# Patient Record
Sex: FEMALE | Race: WHITE | NOT HISPANIC OR LATINO | Employment: FULL TIME | URBAN - METROPOLITAN AREA
[De-identification: names, ages, dates, MRNs, and addresses within clinical notes are randomized per-mention and may not be internally consistent; named-entity substitution may affect disease eponyms.]

---

## 2018-11-05 ENCOUNTER — OFFICE VISIT CONVERTED (OUTPATIENT)
Dept: GASTROENTEROLOGY | Facility: CLINIC | Age: 38
End: 2018-11-05
Attending: PHYSICIAN ASSISTANT

## 2019-11-05 ENCOUNTER — OFFICE VISIT CONVERTED (OUTPATIENT)
Dept: GASTROENTEROLOGY | Facility: CLINIC | Age: 39
End: 2019-11-05
Attending: PHYSICIAN ASSISTANT

## 2019-11-07 ENCOUNTER — HOSPITAL ENCOUNTER (OUTPATIENT)
Dept: GENERAL RADIOLOGY | Facility: HOSPITAL | Age: 39
Discharge: HOME | End: 2019-11-07
Attending: PHYSICIAN ASSISTANT

## 2019-11-25 ENCOUNTER — HOSPITAL ENCOUNTER (OUTPATIENT)
Dept: NUCLEAR MEDICINE | Facility: HOSPITAL | Age: 39
Discharge: HOME | End: 2019-11-25
Attending: PHYSICIAN ASSISTANT

## 2019-12-17 ENCOUNTER — HOSPITAL ENCOUNTER (OUTPATIENT)
Dept: GASTROENTEROLOGY | Facility: HOSPITAL | Age: 39
Setting detail: HOSPITAL OUTPATIENT SURGERY
Discharge: HOME | End: 2019-12-17
Attending: INTERNAL MEDICINE

## 2019-12-17 LAB — HCG UR QL: NEGATIVE

## 2020-02-28 ENCOUNTER — OFFICE VISIT CONVERTED (OUTPATIENT)
Dept: FAMILY MEDICINE CLINIC | Facility: CLINIC | Age: 40
End: 2020-02-28
Attending: PHYSICIAN ASSISTANT

## 2020-03-13 ENCOUNTER — HOSPITAL ENCOUNTER (OUTPATIENT)
Dept: LAB | Facility: HOSPITAL | Age: 40
Discharge: HOME | End: 2020-03-13
Attending: PHYSICIAN ASSISTANT

## 2020-03-13 LAB
ALBUMIN SERPL-MCNC: 4.1 G/DL (ref 3.5–5)
ALBUMIN/GLOB SERPL: 1.4 {RATIO} (ref 1.4–2.6)
ALP SERPL-CCNC: 55 U/L (ref 42–98)
ALT SERPL-CCNC: 13 U/L (ref 10–40)
ANION GAP SERPL CALC-SCNC: 18 MMOL/L (ref 8–19)
AST SERPL-CCNC: 14 U/L (ref 15–50)
BASOPHILS # BLD AUTO: 0.05 10*3/UL (ref 0–0.2)
BASOPHILS NFR BLD AUTO: 0.7 % (ref 0–3)
BILIRUB SERPL-MCNC: 0.2 MG/DL (ref 0.2–1.3)
BUN SERPL-MCNC: 10 MG/DL (ref 5–25)
BUN/CREAT SERPL: 12 {RATIO} (ref 6–20)
CALCIUM SERPL-MCNC: 9 MG/DL (ref 8.7–10.4)
CHLORIDE SERPL-SCNC: 104 MMOL/L (ref 99–111)
CHOLEST SERPL-MCNC: 209 MG/DL (ref 107–200)
CHOLEST/HDLC SERPL: 3.6 {RATIO} (ref 3–6)
CONV ABS IMM GRAN: 0.02 10*3/UL (ref 0–0.2)
CONV CO2: 22 MMOL/L (ref 22–32)
CONV IMMATURE GRAN: 0.3 % (ref 0–1.8)
CONV TOTAL PROTEIN: 7.1 G/DL (ref 6.3–8.2)
CREAT UR-MCNC: 0.81 MG/DL (ref 0.5–0.9)
DEPRECATED RDW RBC AUTO: 40.6 FL (ref 36.4–46.3)
EOSINOPHIL # BLD AUTO: 0.12 10*3/UL (ref 0–0.7)
EOSINOPHIL # BLD AUTO: 1.6 % (ref 0–7)
ERYTHROCYTE [DISTWIDTH] IN BLOOD BY AUTOMATED COUNT: 13 % (ref 11.7–14.4)
GFR SERPLBLD BASED ON 1.73 SQ M-ARVRAT: >60 ML/MIN/{1.73_M2}
GLOBULIN UR ELPH-MCNC: 3 G/DL (ref 2–3.5)
GLUCOSE SERPL-MCNC: 94 MG/DL (ref 65–99)
HCT VFR BLD AUTO: 40.5 % (ref 37–47)
HDLC SERPL-MCNC: 58 MG/DL (ref 40–60)
HGB BLD-MCNC: 13.1 G/DL (ref 12–16)
IRON SATN MFR SERPL: 13 % (ref 20–55)
IRON SERPL-MCNC: 58 UG/DL (ref 60–170)
LDLC SERPL CALC-MCNC: 139 MG/DL (ref 70–100)
LYMPHOCYTES # BLD AUTO: 2.87 10*3/UL (ref 1–5)
LYMPHOCYTES NFR BLD AUTO: 38.1 % (ref 20–45)
MCH RBC QN AUTO: 27.9 PG (ref 27–31)
MCHC RBC AUTO-ENTMCNC: 32.3 G/DL (ref 33–37)
MCV RBC AUTO: 86.4 FL (ref 81–99)
MONOCYTES # BLD AUTO: 0.54 10*3/UL (ref 0.2–1.2)
MONOCYTES NFR BLD AUTO: 7.2 % (ref 3–10)
NEUTROPHILS # BLD AUTO: 3.94 10*3/UL (ref 2–8)
NEUTROPHILS NFR BLD AUTO: 52.1 % (ref 30–85)
NRBC CBCN: 0 % (ref 0–0.7)
OSMOLALITY SERPL CALC.SUM OF ELEC: 289 MOSM/KG (ref 273–304)
PLATELET # BLD AUTO: 277 10*3/UL (ref 130–400)
PMV BLD AUTO: 10.9 FL (ref 9.4–12.3)
POTASSIUM SERPL-SCNC: 4 MMOL/L (ref 3.5–5.3)
RBC # BLD AUTO: 4.69 10*6/UL (ref 4.2–5.4)
SODIUM SERPL-SCNC: 140 MMOL/L (ref 135–147)
T4 FREE SERPL-MCNC: 1.1 NG/DL (ref 0.9–1.8)
TIBC SERPL-MCNC: 432 UG/DL (ref 245–450)
TRANSFERRIN SERPL-MCNC: 302 MG/DL (ref 250–380)
TRIGL SERPL-MCNC: 59 MG/DL (ref 40–150)
TSH SERPL-ACNC: 2.95 M[IU]/L (ref 0.27–4.2)
VIT B12 SERPL-MCNC: 230 PG/ML (ref 211–911)
VLDLC SERPL-MCNC: 12 MG/DL (ref 5–37)
WBC # BLD AUTO: 7.54 10*3/UL (ref 4.8–10.8)

## 2020-04-20 ENCOUNTER — TELEPHONE CONVERTED (OUTPATIENT)
Dept: GASTROENTEROLOGY | Facility: CLINIC | Age: 40
End: 2020-04-20
Attending: PHYSICIAN ASSISTANT

## 2020-11-09 ENCOUNTER — TELEMEDICINE CONVERTED (OUTPATIENT)
Dept: GASTROENTEROLOGY | Facility: CLINIC | Age: 40
End: 2020-11-09
Attending: NURSE PRACTITIONER

## 2020-12-11 ENCOUNTER — OFFICE VISIT CONVERTED (OUTPATIENT)
Dept: FAMILY MEDICINE CLINIC | Facility: CLINIC | Age: 40
End: 2020-12-11
Attending: PHYSICIAN ASSISTANT

## 2020-12-21 ENCOUNTER — HOSPITAL ENCOUNTER (OUTPATIENT)
Dept: GASTROENTEROLOGY | Facility: HOSPITAL | Age: 40
Setting detail: HOSPITAL OUTPATIENT SURGERY
Discharge: HOME | End: 2020-12-21
Attending: INTERNAL MEDICINE

## 2020-12-21 LAB — HCG UR QL: NEGATIVE

## 2021-05-12 NOTE — PROGRESS NOTES
Quick Note      Patient Name: Heidi Mills   Patient ID: [id number]   Sex: Female   YOB: 1980    Primary Care Provider: Yi CHILDRESS   Referring Provider: Yi CHILDRESS    Visit Date: April 20, 2020    Provider: Mingo Hinton PA-C   Location: [facility]   Location Address: [address] [place] [address]  [id number]   Location Phone: [phone]          History Of Present Illness  TELEHEALTH TELEPHONE VISIT  Chief Complaint: Follow up EGD   Heidi Mills is a 39 year old /White female who is presenting for evaluation via telehealth telephone visit. Verbal consent obtained before beginning visit.   Provider spent 11 minutes with the patient during telehealth visit.   The following staff were present during this visit: Cj Bacon MA   Past Medical History/Overview of Patient Symptoms     39 year old female follows up for her EGD results.  She underwent the procedure for epigastric pain and bloating.  Prior to the EGD, she had a normal HIDA scan with an ejection fraction 73%.  Her EGD by Dr. Bolton 02/2020 showed a medium hiatal hernia.  She takes omeprazole 40mg prn with good control of symptoms.  She has also eliminated dietary triggers and is doing well.           Assessment  · Bloating     787.3/R14.0  · GERD (gastroesophageal reflux disease)     530.81/K21.9      Plan  · Orders  o Physician Telephone Evaluation, 11-20 minutes (09393) - 787.3/R14.0, 530.81/K21.9 - 04/20/2020  · Instructions  o Plan Of Care: Overall, the patient is doing well. She is to continue omeprazole as needed and continue avoidance of dietary triggers. She will follow up as needed.  o Chronic conditions reviewed and taken into consideration for today's treatment plan.  o Patient instructed to seek medical attention urgently for new or worsening symptoms.  o Patient was educated/instructed on their diagnosis, treatment and medications prior to discharge from the clinic  today.            Electronically Signed by: Mingo Hinton PA-C -Author on April 20, 2020 10:38:30 AM  Electronically Co-signed by: Dakota Bolton MD -Reviewer on May 7, 2020 05:28:58 PM

## 2021-05-13 NOTE — PROGRESS NOTES
Progress Note      Patient Name: Heidi Mills   Patient ID: [id number]   Sex: Female   YOB: 1980    Primary Care Provider: Yi CHILDRESS   Referring Provider: Yi CHILDRESS    Visit Date: December 11, 2020    Provider: FIOR Joe   Location: [facility]   Location Address: [address] [place] [address], [address]  [place], [place]  [id number]   Location Phone: [phone]          Chief Complaint  · follow up   · medication refill       History Of Present Illness  Heidi Mills is a 40 year old /White female who presents for evaluation and treatment of:      Patient is here today for follow up and medication refill     Anxiety: She is taking Zoloft 50mg with good results. She states she feels like she is doing well on current dose. She does need refill today. Does note Fatigue; last labs showed b12 and iron deficiency. Pt is unable to take iron secondary to GI issues; was taking b12 and felt better but ran out, just started back this week.    She is having colonoscopy 12/21/20 for ? ulcerative colitis by Dr Bolton       Past Medical History  Disease Name Date Onset Notes   Allergic rhinitis, chronic --  --    Anemia, Unspecified --  --    Anxiety 02/28/2020 --    GERD (gastroesophageal reflux disease) 02/28/2020 --    Hyperlipidemia 02/28/2020 --    IBS (irritable bowel syndrome) 02/28/2020 --    Microscopic hematuria 03/18/2016 --    Pain: Knee --  --    Screening for colon cancer 3/11/16 Per CLAUDIA Bolton, internal hemorrhoids, 1 polyp - hyperplastic         Past Surgical History  Procedure Name Date Notes   Colonoscopy 2013 2016 --    Deviated Septum Surgery --  --    EGD 2016 2019 --    Tonsilectomy --  --          Medication List  Name Date Started Instructions   dicyclomine 20 mg oral tablet  take 1 tablet by oral route As needed   Levora-28 0.15-0.03 mg oral tablet  take 1 tablet by oral route once daily   Suprep Bowel Prep Kit  "17.5-3.13-1.6 gram oral recon soln 11/10/2020 take as directed for 1 day   Zyrtec 10 mg oral tablet  take 1 tablet (10 mg) by oral route once daily         Allergy List  Allergen Name Date Reaction Notes   Vicodin --  itching --        Allergies Reconciled  Family Medical History  Disease Name Relative/Age Notes   Diabetes, unspecified type  Uncle (maternal)   Hypertension Father/  Mother/   --    Family history of colon cancer  Uncle/60s   Family history of stroke Grandmother (maternal)/   --          Social History  Finding Status Start/Stop Quantity Notes   Alcohol Never 0/0 --  drinks no   Caffeine Current every day 0/0 --  drinks regularly; coffee, tea and soft drinks; 1-2 times per day    --  --/-- --  --    Second hand smoke exposure Never 0/0 --  no   Tobacco Never --/-- --  never a smoker         Immunizations  NameDate Admin Mfg Trade Name Lot Number Route Inj VIS Given VIS Publication   [id number]/11/2020 [employer] Fluzone Quadrivalent  NE NE 12/11/2020    Comments: at work         Review of Systems  · Constitutional  o Admits  o : fatigue  o Denies  o : fever, weight loss, weight gain  · Cardiovascular  o Denies  o : lower extremity edema, claudication, chest pressure, palpitations  · Respiratory  o Denies  o : shortness of breath, wheezing, cough, hemoptysis, dyspnea on exertion  · Gastrointestinal  o Denies  o : nausea, vomiting, diarrhea, constipation, abdominal pain      Vitals  Date Time BP Position Site L\R Cuff Size HR RR TEMP (F) WT  HT  BMI kg/m2 BSA m2 O2 Sat FR L/min FiO2 HC       12/11/2020 01:21 /68 Sitting    66 - R  98 162lbs 4oz 5'  4\" 27.85 1.82 97 %  21%          Physical Examination  · Constitutional  o Appearance  o : well developed, well-nourished, no acute distress  · Head and Face  o Head  o : normocephalic, atraumatic  · Neck  o Inspection/Palpation  o : normal appearance, no masses or tenderness, trachea midline  o Thyroid  o : gland size normal, nontender, no nodules or " masses present on palpation  · Respiratory  o Respiratory Effort  o : breathing unlabored  o Inspection of Chest  o : chest rise symmetric bilaterally  o Auscultation of Lungs  o : clear to auscultation bilaterally throughout inspiration and expiration  · Cardiovascular  o Heart  o :   § Auscultation of Heart  § : regular rate and rhythm, no murmurs, gallops or rubs  o Peripheral Vascular System  o :   § Extremities  § : no edema  · Lymphatic  o Neck  o : no cervical lymphadenopathy, no supraclavicular lymphadenopathy  · Psychiatric  o Mood and Affect  o : mood normal, affect appropriate          Assessment  · Fatigue     780.79/R53.83  · Anxiety     300.00/F41.9  Continue with Zoloft 50mg qd  · Iron deficiency     280.9/E61.1  · B12 deficiency     266.2/E53.8       Recheck labs about 1 mth after being back on b12 supplement.     Problems Reconciled  Plan  · Orders  o Female Fatigue Panel (CBC, CMP, TSH, B12) HM (73237, 31512, 13586, 57608) - 780.79/R53.83 - 12/11/2020  o Iron panel (iron, TIBC, transferrin saturation) (09880, 82418, 05215) - 780.79/R53.83 - 12/11/2020  o ACO-39: Current medications updated and reviewed (, 1159F) - - 12/11/2020  · Medications  o Zoloft 50 mg oral tablet   SIG: take 1 tablet (50 mg) by oral route once daily for 90 days   DISP: (90) Tablet with 1 refills  Prescribed on 12/11/2020     o SERTRALINE HCL 50 MG TABLET 50 Tablet   SIG: TAKE 1 TABLET BY MOUTH EVERY DAY   DISP: (30) Tablet with -1 refills  Discontinued on 12/11/2020     o Medications have been Reconciled  o Transition of Care or Provider Policy  · Instructions  o Patient is taking medications as prescribed and doing well.   o Patient was educated/instructed on their diagnosis, treatment and medications prior to discharge from the clinic today.  o Call the office with any concerns or questions.  o Chronic conditions reviewed and taken into consideration for today's treatment plan.  o Electronically Identified Patient  Education Materials Provided Electronically  · Disposition  o Follow Up in 6 months.            Electronically Signed by: FIOR Joe -Author on December 11, 2020 02:08:03 PM

## 2021-05-13 NOTE — PROGRESS NOTES
Progress Note      Patient Name: Heidi Mills   Patient ID: [id number]   Sex: Female   YOB: 1980    Primary Care Provider: Yi CHILDRESS   Referring Provider: Yi CHILDRESS    Visit Date: November 9, 2020    Provider: BENNIE Phillip   Location: [facility]   Location Address: [address] [place] [address]  [id number]   Location Phone: [phone]          Chief Complaint  · ED f/u      History Of Present Illness  Video Conferencing Visit  Heidi Mills is a 40 year old /White female who is presenting for evaluation via video conferencing via [employer]. Verbal consent obtained before beginning visit.   The following staff were present during this visit: Isa Marrufo MA      40-year-old female with a history of bloating and GERD agrees to telehealth visit.  She had an ER visit on 08/25/2020 for abdominal pain.  She reports that she was having lower abdominal cramping back in August.  Nothing was alleviating or aggravating the pain.  CT abdomen/pelvis with contrast at that visit revealed colitis of the distal descending and sigmoid colon.  She was given dicyclomine, Cipro, and Flagyl, at that ER visit.  Her pain has greatly improved since her ER visit.  She reports her bowel habits have since returned back to normal since her episode of abdominal pain.  She reports she will have 1 formed bowel movement per day, with no diarrhea.  She reports that she had rectal bleeding along with the episode of abdominal pain in August.  The day she went to the ER, she had blood in the toilet bowl.  She has had 2-3 small episodes of rectal bleeding since the ER visit.  She denies family history of IBD.  She reports her uncle had colon cancer.  Her last colonoscopy in 2016 by Dr. Bolton revealed hyperplastic polyp removed from the splenic flexure.  EGD 12/2019 by Dr. Bolton revealed a medium size hiatal hernia.    Labs 08/25/2020: Hemoglobin 14,  hematocrit 41.2, platelets 288, ALT 11, AST 15, alk phos 62, total bili 0.46, creatinine 0.87, GFR greater than 60         Past Medical History  Allergic rhinitis, chronic; Anemia, Unspecified; Anxiety; GERD (gastroesophageal reflux disease); Hyperlipidemia; IBS (irritable bowel syndrome); Microscopic hematuria; Pain: Knee; Screening for colon cancer         Past Surgical History  Colonoscopy; Deviated Septum Surgery; EGD; Tonsilectomy         Medication List  dicyclomine 20 mg oral tablet; Levora-28 0.15-0.03 mg oral tablet; Zoloft 50 mg oral tablet; Zyrtec 10 mg oral tablet         Allergy List  Vicodin       Allergies Reconciled  Family Medical History  Diabetes, unspecified type; Hypertension; Family history of colon cancer; Family history of stroke         Social History  Alcohol (Never); Caffeine (Current every day); ; Second hand smoke exposure (Never); Tobacco (Never)         Immunizations  Name Date Admin   Influenza 10/01/2019         Review of Systems  · Constitutional  o Denies  o : chills, fever  · Cardiovascular  o Denies  o : chest pain  · Respiratory  o Denies  o : shortness of breath  · Gastrointestinal  o Denies  o : nausea, vomiting, dysphagia  · Endocrine  o Denies  o : weight gain, weight loss      Physical Examination  · Constitutional  o Appearance  o : Healthy-appearing, awake and alert in no acute distress  · Head and Face  o Head  o : Normocephalic with no worriesome skin lesions  · Eyes  o Vision  o :   § Visual Fields  § : eyes move symmetrical in all directions  o Sclerae  o : sclerae anicteric  · Neck  o Inspection/Palpation  o : Trachea is midline, no adenopathy  · Respiratory  o Respiratory Effort  o : Breathing is unlabored.  o Inspection of Chest  o : normal appearance          Assessment  · Rectal Bleeding     569.3/K62.5  · Lower abdominal pain     789.09/R10.30  · Abnormal CT of the abdomen     793.6/R93.5      Plan  · Orders  o Flexible Colonoscopy -Possible  risks/complications, benefits, and alternatives to surgical or invasive procedure have been explained to patient and/or legal gaurdian. -Patient has been evaluated and can tolerate anethesia and/or sedation. Risk, benefits, and alternatives to anethesia and/or sedation have been explained to patient and/or legal gaurdian. (70222) - 789.09/R10.30, 569.3/K62.5, 793.6/R93.5 - 11/09/2020  · Medications  o Medications have been Reconciled  o Transition of Care or Provider Policy  · Instructions  o 40-year-old female agrees to telehealth visit today for follow-up after having an ER visit in August. The patient's symptoms have improved since her ER visit. We have discussed undergoing a colonoscopy due to having an abnormal CT of the abdomen, rectal bleeding, and change in bowel habits. The patient has been informed that Covid testing is required prior to the procedure. The patient is agreeable to schedule a colonoscopy.  o Electronically Identified Patient Education Materials Provided Electronically            Electronically Signed by: BENNIE Phillip -Author on November 9, 2020 11:08:57 AM  Electronically Co-signed by: Dakota Bolton MD -Reviewer on November 18, 2020 12:48:50 PM

## 2021-05-14 VITALS
DIASTOLIC BLOOD PRESSURE: 68 MMHG | SYSTOLIC BLOOD PRESSURE: 118 MMHG | WEIGHT: 162.25 LBS | TEMPERATURE: 98 F | HEIGHT: 64 IN | HEART RATE: 66 BPM | BODY MASS INDEX: 27.7 KG/M2 | OXYGEN SATURATION: 97 %

## 2021-05-15 VITALS
BODY MASS INDEX: 27.23 KG/M2 | SYSTOLIC BLOOD PRESSURE: 112 MMHG | WEIGHT: 159.5 LBS | TEMPERATURE: 98 F | OXYGEN SATURATION: 99 % | DIASTOLIC BLOOD PRESSURE: 57 MMHG | HEART RATE: 76 BPM | HEIGHT: 64 IN | RESPIRATION RATE: 12 BRPM

## 2021-05-15 VITALS
HEIGHT: 64 IN | WEIGHT: 153 LBS | HEART RATE: 69 BPM | BODY MASS INDEX: 26.12 KG/M2 | DIASTOLIC BLOOD PRESSURE: 67 MMHG | SYSTOLIC BLOOD PRESSURE: 126 MMHG | RESPIRATION RATE: 16 BRPM

## 2021-05-16 VITALS
SYSTOLIC BLOOD PRESSURE: 119 MMHG | WEIGHT: 150 LBS | DIASTOLIC BLOOD PRESSURE: 71 MMHG | HEIGHT: 64 IN | BODY MASS INDEX: 25.61 KG/M2 | RESPIRATION RATE: 16 BRPM | HEART RATE: 70 BPM

## 2021-07-02 ENCOUNTER — TELEPHONE (OUTPATIENT)
Dept: FAMILY MEDICINE CLINIC | Facility: CLINIC | Age: 41
End: 2021-07-02

## 2021-07-02 DIAGNOSIS — E78.5 HYPERLIPIDEMIA, UNSPECIFIED HYPERLIPIDEMIA TYPE: ICD-10-CM

## 2021-07-02 DIAGNOSIS — R53.83 OTHER FATIGUE: Primary | ICD-10-CM

## 2021-07-02 DIAGNOSIS — F41.9 ANXIETY: ICD-10-CM

## 2021-07-02 NOTE — TELEPHONE ENCOUNTER
Caller: Heidi Mills    Relationship: Self    Best call back number: [phone]    What is the best time to reach you: ANYTIME    Who are you requesting to speak with (clinical staff, provider,  specific staff member): MEDICAL STAFF    What was the call regarding: PATIENT WOULD LIKE TO KNOW IF THERE ARE ACTIVE LAB ORDERS. IF NOT, PATIENT WOULD LIKE LAB ORDERS TO GET HER LABS DONE. ATTEMPTED TO WARM TRANSFER. PLEASE CALL PATIENT TO ADVISE.

## 2021-07-07 ENCOUNTER — LAB (OUTPATIENT)
Dept: LAB | Facility: HOSPITAL | Age: 41
End: 2021-07-07

## 2021-07-07 DIAGNOSIS — F41.9 ANXIETY: ICD-10-CM

## 2021-07-07 DIAGNOSIS — E78.5 HYPERLIPIDEMIA, UNSPECIFIED HYPERLIPIDEMIA TYPE: ICD-10-CM

## 2021-07-07 DIAGNOSIS — R53.83 OTHER FATIGUE: ICD-10-CM

## 2021-07-07 PROBLEM — D64.9 ANEMIA: Status: ACTIVE | Noted: 2021-07-07

## 2021-07-07 PROBLEM — K58.9 IBS (IRRITABLE BOWEL SYNDROME): Status: ACTIVE | Noted: 2020-02-28

## 2021-07-07 PROBLEM — K21.9 GERD (GASTROESOPHAGEAL REFLUX DISEASE): Status: ACTIVE | Noted: 2020-02-28

## 2021-07-07 LAB
25(OH)D3 SERPL-MCNC: 37.3 NG/ML (ref 30–100)
ALBUMIN SERPL-MCNC: 4.1 G/DL (ref 3.5–5.2)
ALBUMIN/GLOB SERPL: 1.5 G/DL
ALP SERPL-CCNC: 57 U/L (ref 39–117)
ALT SERPL W P-5'-P-CCNC: 17 U/L (ref 1–33)
ANION GAP SERPL CALCULATED.3IONS-SCNC: 10.7 MMOL/L (ref 5–15)
AST SERPL-CCNC: 17 U/L (ref 1–32)
BASOPHILS # BLD AUTO: 0.07 10*3/MM3 (ref 0–0.2)
BASOPHILS NFR BLD AUTO: 1 % (ref 0–1.5)
BILIRUB SERPL-MCNC: 0.2 MG/DL (ref 0–1.2)
BUN SERPL-MCNC: 10 MG/DL (ref 6–20)
BUN/CREAT SERPL: 12.7 (ref 7–25)
CALCIUM SPEC-SCNC: 9.2 MG/DL (ref 8.6–10.5)
CHLORIDE SERPL-SCNC: 106 MMOL/L (ref 98–107)
CHOLEST SERPL-MCNC: 235 MG/DL (ref 0–200)
CO2 SERPL-SCNC: 22.3 MMOL/L (ref 22–29)
CREAT SERPL-MCNC: 0.79 MG/DL (ref 0.57–1)
DEPRECATED RDW RBC AUTO: 39.6 FL (ref 37–54)
EOSINOPHIL # BLD AUTO: 0.14 10*3/MM3 (ref 0–0.4)
EOSINOPHIL NFR BLD AUTO: 1.9 % (ref 0.3–6.2)
ERYTHROCYTE [DISTWIDTH] IN BLOOD BY AUTOMATED COUNT: 12.5 % (ref 12.3–15.4)
FOLATE SERPL-MCNC: 15.6 NG/ML (ref 4.78–24.2)
GFR SERPL CREATININE-BSD FRML MDRD: 81 ML/MIN/1.73
GLOBULIN UR ELPH-MCNC: 2.8 GM/DL
GLUCOSE SERPL-MCNC: 89 MG/DL (ref 65–99)
HCT VFR BLD AUTO: 41 % (ref 34–46.6)
HDLC SERPL-MCNC: 56 MG/DL (ref 40–60)
HGB BLD-MCNC: 13.8 G/DL (ref 12–15.9)
IMM GRANULOCYTES # BLD AUTO: 0.01 10*3/MM3 (ref 0–0.05)
IMM GRANULOCYTES NFR BLD AUTO: 0.1 % (ref 0–0.5)
IRON 24H UR-MRATE: 61 MCG/DL (ref 37–145)
IRON SATN MFR SERPL: 15 % (ref 20–50)
LDLC SERPL CALC-MCNC: 160 MG/DL (ref 0–100)
LDLC/HDLC SERPL: 2.82 {RATIO}
LYMPHOCYTES # BLD AUTO: 3.48 10*3/MM3 (ref 0.7–3.1)
LYMPHOCYTES NFR BLD AUTO: 47.5 % (ref 19.6–45.3)
MCH RBC QN AUTO: 29.1 PG (ref 26.6–33)
MCHC RBC AUTO-ENTMCNC: 33.7 G/DL (ref 31.5–35.7)
MCV RBC AUTO: 86.5 FL (ref 79–97)
MONOCYTES # BLD AUTO: 0.52 10*3/MM3 (ref 0.1–0.9)
MONOCYTES NFR BLD AUTO: 7.1 % (ref 5–12)
NEUTROPHILS NFR BLD AUTO: 3.11 10*3/MM3 (ref 1.7–7)
NEUTROPHILS NFR BLD AUTO: 42.4 % (ref 42.7–76)
NRBC BLD AUTO-RTO: 0 /100 WBC (ref 0–0.2)
PLATELET # BLD AUTO: 232 10*3/MM3 (ref 140–450)
PMV BLD AUTO: 10.9 FL (ref 6–12)
POTASSIUM SERPL-SCNC: 3.9 MMOL/L (ref 3.5–5.2)
PROT SERPL-MCNC: 6.9 G/DL (ref 6–8.5)
RBC # BLD AUTO: 4.74 10*6/MM3 (ref 3.77–5.28)
SODIUM SERPL-SCNC: 139 MMOL/L (ref 136–145)
T4 FREE SERPL-MCNC: 0.87 NG/DL (ref 0.93–1.7)
TIBC SERPL-MCNC: 399 MCG/DL (ref 298–536)
TRANSFERRIN SERPL-MCNC: 268 MG/DL (ref 200–360)
TRIGL SERPL-MCNC: 106 MG/DL (ref 0–150)
TSH SERPL DL<=0.05 MIU/L-ACNC: 4.01 UIU/ML (ref 0.27–4.2)
VIT B12 BLD-MCNC: 1717 PG/ML (ref 211–946)
VLDLC SERPL-MCNC: 19 MG/DL (ref 5–40)
WBC # BLD AUTO: 7.33 10*3/MM3 (ref 3.4–10.8)

## 2021-07-07 PROCEDURE — 83540 ASSAY OF IRON: CPT

## 2021-07-07 PROCEDURE — 84443 ASSAY THYROID STIM HORMONE: CPT

## 2021-07-07 PROCEDURE — 82607 VITAMIN B-12: CPT

## 2021-07-07 PROCEDURE — 82306 VITAMIN D 25 HYDROXY: CPT

## 2021-07-07 PROCEDURE — 84439 ASSAY OF FREE THYROXINE: CPT

## 2021-07-07 PROCEDURE — 85025 COMPLETE CBC W/AUTO DIFF WBC: CPT

## 2021-07-07 PROCEDURE — 84466 ASSAY OF TRANSFERRIN: CPT

## 2021-07-07 PROCEDURE — 82746 ASSAY OF FOLIC ACID SERUM: CPT

## 2021-07-07 PROCEDURE — 80053 COMPREHEN METABOLIC PANEL: CPT

## 2021-07-07 PROCEDURE — 36415 COLL VENOUS BLD VENIPUNCTURE: CPT

## 2021-07-07 PROCEDURE — 80061 LIPID PANEL: CPT

## 2021-07-07 RX ORDER — CETIRIZINE HYDROCHLORIDE 10 MG/1
1 TABLET ORAL DAILY
COMMUNITY

## 2021-07-07 RX ORDER — NORETHINDRONE ACETATE AND ETHINYL ESTRADIOL AND FERROUS FUMARATE 1MG-20(24)
1 KIT ORAL DAILY
COMMUNITY
Start: 2021-06-28 | End: 2021-12-08

## 2021-07-07 RX ORDER — DICYCLOMINE HCL 20 MG
1 TABLET ORAL DAILY
COMMUNITY
End: 2022-03-14 | Stop reason: SDUPTHER

## 2021-07-07 NOTE — PROGRESS NOTES
Chief Complaint  Chief Complaint   Patient presents with   • Anxiety     medication refill        Subjective          Heidi Mills presents to [facility]  History of Present Illness     She is also c/o left groin area tender and swollen. NKI, she denies redness and fever. She does note a red lesion about dime size on left hip but doesn't recall tick bite. Denies fever; joint pain or other rash. Denies n/v. Denies urinary sx or vaginal discharge.    Anxiety: Patient is currently on Zoloft  for anxiety and doing well. Patient states that symptoms are well controlled. She is needing refill today     H/o anemia: stable with b12 supplement; b12 elevated therefore can decrease but not stop    HL: newly diagnosed with recent labs; will start medication today.    Reviewed labs with patient.    Medical History: has a past medical history of Anemia, Anxiety (02/28/2020), Chronic allergic rhinitis, GERD (gastroesophageal reflux disease) (02/28/2020), Hyperlipidemia (02/28/2020), IBS (irritable bowel syndrome) (02/28/2020), Microscopic hematuria (03/18/2016), Pain, knee, and Screening for colon cancer (03/11/2016).   Surgical History: has a past surgical history that includes Colonoscopy (2013,2016,2020); Nasal septum surgery; Esophagogastroduodenoscopy (2016, 2019); and Tonsillectomy.   Family History: family history includes Colon cancer in an other family member; Diabetes in her maternal uncle; Hypertension in her father and mother; Stroke in her maternal grandmother.   Social History: reports that she has never smoked. She has never used smokeless tobacco. She reports that she does not drink alcohol and does not use drugs.    Allergies: Hydrocodone-acetaminophen    Health Maintenance Due   Topic Date Due   • ANNUAL PHYSICAL  Never done   • COVID-19 Vaccine (1) Never done   • HEPATITIS C SCREENING  Never done   • PAP SMEAR  Never done       Immunization History   Administered Date(s)  "Administered   • Flu Vaccine Quad PF >18YRS 12/11/2020   • Flu Vaccine Quad PF >36MO 10/20/2020   • Influenza, Unspecified 12/11/2020   • Tdap 11/06/2013       Objective     Vitals:    07/09/21 1030   BP: 117/78   Pulse: 69   Temp: 97.6 °F (36.4 °C)   TempSrc: Temporal   SpO2: 97%   Weight: 70.9 kg (156 lb 6 oz)   Height: 162.6 cm (64\")     Body mass index is 26.84 kg/m².       Physical Exam  Vitals and nursing note reviewed.   Constitutional:       Appearance: Normal appearance.   HENT:      Head: Normocephalic and atraumatic.   Neck:      Vascular: No carotid bruit.      Comments: Thyroid : gland size normal, nontender, no nodules or masses present on palpation   Cardiovascular:      Rate and Rhythm: Normal rate and regular rhythm.      Pulses: Normal pulses.      Heart sounds: Normal heart sounds.   Pulmonary:      Effort: Pulmonary effort is normal.      Breath sounds: Normal breath sounds.   Musculoskeletal:      Cervical back: Neck supple. No tenderness.      Right lower leg: No edema.      Left lower leg: No edema.   Lymphadenopathy:      Cervical: No cervical adenopathy.   Skin:     Comments: Small red dime size lesion on left hip; about 1 cm lymph node left inguinal area tender to palpation.   Neurological:      Mental Status: She is alert.   Psychiatric:         Mood and Affect: Mood normal.         Behavior: Behavior normal.           Result Review :                      Lab on 07/07/2021   Component Date Value Ref Range Status   • Total Cholesterol 07/07/2021 235* 0 - 200 mg/dL Final   • Triglycerides 07/07/2021 106  0 - 150 mg/dL Final   • HDL Cholesterol 07/07/2021 56  40 - 60 mg/dL Final   • LDL Cholesterol  07/07/2021 160* 0 - 100 mg/dL Final   • VLDL Cholesterol 07/07/2021 19  5 - 40 mg/dL Final   • LDL/HDL Ratio 07/07/2021 2.82   Final   • Folate 07/07/2021 15.60  4.78 - 24.20 ng/mL Final   • Vitamin B-12 07/07/2021 1,717* 211 - 946 pg/mL Final   • TSH 07/07/2021 4.010  0.270 - 4.200 uIU/mL Final "   • Free T4 07/07/2021 0.87* 0.93 - 1.70 ng/dL Final    T4 results may be falsely increased if patient taking Biotin.   • Iron 07/07/2021 61  37 - 145 mcg/dL Final   • Iron Saturation 07/07/2021 15* 20 - 50 % Final   • Transferrin 07/07/2021 268  200 - 360 mg/dL Final   • TIBC 07/07/2021 399  298 - 536 mcg/dL Final   • 25 Hydroxy, Vitamin D 07/07/2021 37.3  30.0 - 100.0 ng/ml Final   • Glucose 07/07/2021 89  65 - 99 mg/dL Final   • BUN 07/07/2021 10  6 - 20 mg/dL Final   • Creatinine 07/07/2021 0.79  0.57 - 1.00 mg/dL Final   • Sodium 07/07/2021 139  136 - 145 mmol/L Final   • Potassium 07/07/2021 3.9  3.5 - 5.2 mmol/L Final   • Chloride 07/07/2021 106  98 - 107 mmol/L Final   • CO2 07/07/2021 22.3  22.0 - 29.0 mmol/L Final   • Calcium 07/07/2021 9.2  8.6 - 10.5 mg/dL Final   • Total Protein 07/07/2021 6.9  6.0 - 8.5 g/dL Final   • Albumin 07/07/2021 4.10  3.50 - 5.20 g/dL Final   • ALT (SGPT) 07/07/2021 17  1 - 33 U/L Final   • AST (SGOT) 07/07/2021 17  1 - 32 U/L Final   • Alkaline Phosphatase 07/07/2021 57  39 - 117 U/L Final   • Total Bilirubin 07/07/2021 0.2  0.0 - 1.2 mg/dL Final   • eGFR Non  Amer 07/07/2021 81  >60 mL/min/1.73 Final   • Globulin 07/07/2021 2.8  gm/dL Final   • A/G Ratio 07/07/2021 1.5  g/dL Final   • BUN/Creatinine Ratio 07/07/2021 12.7  7.0 - 25.0 Final   • Anion Gap 07/07/2021 10.7  5.0 - 15.0 mmol/L Final   • WBC 07/07/2021 7.33  3.40 - 10.80 10*3/mm3 Final   • RBC 07/07/2021 4.74  3.77 - 5.28 10*6/mm3 Final   • Hemoglobin 07/07/2021 13.8  12.0 - 15.9 g/dL Final   • Hematocrit 07/07/2021 41.0  34.0 - 46.6 % Final   • MCV 07/07/2021 86.5  79.0 - 97.0 fL Final   • MCH 07/07/2021 29.1  26.6 - 33.0 pg Final   • MCHC 07/07/2021 33.7  31.5 - 35.7 g/dL Final   • RDW 07/07/2021 12.5  12.3 - 15.4 % Final   • RDW-SD 07/07/2021 39.6  37.0 - 54.0 fl Final   • MPV 07/07/2021 10.9  6.0 - 12.0 fL Final   • Platelets 07/07/2021 232  140 - 450 10*3/mm3 Final   • Neutrophil % 07/07/2021 42.4* 42.7 -  76.0 % Final   • Lymphocyte % 07/07/2021 47.5* 19.6 - 45.3 % Final   • Monocyte % 07/07/2021 7.1  5.0 - 12.0 % Final   • Eosinophil % 07/07/2021 1.9  0.3 - 6.2 % Final   • Basophil % 07/07/2021 1.0  0.0 - 1.5 % Final   • Immature Grans % 07/07/2021 0.1  0.0 - 0.5 % Final   • Neutrophils, Absolute 07/07/2021 3.11  1.70 - 7.00 10*3/mm3 Final   • Lymphocytes, Absolute 07/07/2021 3.48* 0.70 - 3.10 10*3/mm3 Final   • Monocytes, Absolute 07/07/2021 0.52  0.10 - 0.90 10*3/mm3 Final   • Eosinophils, Absolute 07/07/2021 0.14  0.00 - 0.40 10*3/mm3 Final   • Basophils, Absolute 07/07/2021 0.07  0.00 - 0.20 10*3/mm3 Final   • Immature Grans, Absolute 07/07/2021 0.01  0.00 - 0.05 10*3/mm3 Final   • nRBC 07/07/2021 0.0  0.0 - 0.2 /100 WBC Final         Assessment and Plan      Diagnoses and all orders for this visit:    1. Hyperlipidemia, unspecified hyperlipidemia type (Primary)  Comments:  Newly diagnosed; start Crestor 5mg qd; administration and side effects discussed; recheck lipid/CMP in 8 weeks.  Orders:  -     rosuvastatin (Crestor) 5 MG tablet; Take 1 tablet by mouth Daily.  Dispense: 90 tablet; Refill: 1  -     Comprehensive Metabolic Panel; Future  -     Lipid Panel; Future    2. Anemia, unspecified type  Comments:  Stable; decrease dose of b12 but continue to take; check labs and f/u 6mths.    3. Anxiety  Comments:  Stable; continue current medications; f/u 6mths.  Orders:  -     sertraline (ZOLOFT) 50 MG tablet; Take 1 tablet by mouth Daily.  Dispense: 90 tablet; Refill: 1    4. Lymphadenopathy  Comments:  WBC was normal with labs; start antibiotic and continue to monitor; f/u if no improvement or sx worsen.  Orders:  -     doxycycline (VIBRAMYCIN) 100 MG capsule; Take 1 capsule by mouth 2 (Two) Times a Day.  Dispense: 20 capsule; Refill: 0    5. Encounter for medication refill            Follow Up     Return in about 6 months (around 1/9/2022) for Recheck.    Patient was given instructions and counseling regarding  her condition or for health maintenance advice. Please see specific information pulled into the AVS if appropriate.

## 2021-07-09 ENCOUNTER — OFFICE VISIT (OUTPATIENT)
Dept: FAMILY MEDICINE CLINIC | Facility: CLINIC | Age: 41
End: 2021-07-09

## 2021-07-09 VITALS
OXYGEN SATURATION: 97 % | HEART RATE: 69 BPM | TEMPERATURE: 97.6 F | HEIGHT: 64 IN | WEIGHT: 156.38 LBS | SYSTOLIC BLOOD PRESSURE: 117 MMHG | BODY MASS INDEX: 26.7 KG/M2 | DIASTOLIC BLOOD PRESSURE: 78 MMHG

## 2021-07-09 DIAGNOSIS — R59.1 LYMPHADENOPATHY: ICD-10-CM

## 2021-07-09 DIAGNOSIS — Z76.0 ENCOUNTER FOR MEDICATION REFILL: ICD-10-CM

## 2021-07-09 DIAGNOSIS — D64.9 ANEMIA, UNSPECIFIED TYPE: Chronic | ICD-10-CM

## 2021-07-09 DIAGNOSIS — E78.5 HYPERLIPIDEMIA, UNSPECIFIED HYPERLIPIDEMIA TYPE: Primary | Chronic | ICD-10-CM

## 2021-07-09 DIAGNOSIS — F41.9 ANXIETY: Chronic | ICD-10-CM

## 2021-07-09 PROBLEM — K21.9 GERD (GASTROESOPHAGEAL REFLUX DISEASE): Status: RESOLVED | Noted: 2020-02-28 | Resolved: 2021-07-09

## 2021-07-09 PROCEDURE — 99214 OFFICE O/P EST MOD 30 MIN: CPT | Performed by: PHYSICIAN ASSISTANT

## 2021-07-09 RX ORDER — DOXYCYCLINE HYCLATE 100 MG/1
100 CAPSULE ORAL 2 TIMES DAILY
Qty: 20 CAPSULE | Refills: 0 | Status: SHIPPED | OUTPATIENT
Start: 2021-07-09 | End: 2021-12-20

## 2021-07-09 RX ORDER — ROSUVASTATIN CALCIUM 5 MG/1
5 TABLET, COATED ORAL DAILY
Qty: 90 TABLET | Refills: 1 | Status: SHIPPED | OUTPATIENT
Start: 2021-07-09 | End: 2021-12-20 | Stop reason: SDUPTHER

## 2021-08-19 ENCOUNTER — TELEPHONE (OUTPATIENT)
Dept: FAMILY MEDICINE CLINIC | Facility: CLINIC | Age: 41
End: 2021-08-19

## 2021-09-14 ENCOUNTER — LAB (OUTPATIENT)
Dept: LAB | Facility: HOSPITAL | Age: 41
End: 2021-09-14

## 2021-09-14 LAB
ALBUMIN SERPL-MCNC: 4.3 G/DL (ref 3.5–5.2)
ALBUMIN/GLOB SERPL: 1.5 G/DL
ALP SERPL-CCNC: 53 U/L (ref 39–117)
ALT SERPL W P-5'-P-CCNC: 17 U/L (ref 1–33)
ANION GAP SERPL CALCULATED.3IONS-SCNC: 9.9 MMOL/L (ref 5–15)
AST SERPL-CCNC: 17 U/L (ref 1–32)
BILIRUB SERPL-MCNC: 0.2 MG/DL (ref 0–1.2)
BUN SERPL-MCNC: 9 MG/DL (ref 6–20)
BUN/CREAT SERPL: 9.1 (ref 7–25)
CALCIUM SPEC-SCNC: 9.4 MG/DL (ref 8.6–10.5)
CHLORIDE SERPL-SCNC: 101 MMOL/L (ref 98–107)
CHOLEST SERPL-MCNC: 192 MG/DL (ref 0–200)
CO2 SERPL-SCNC: 26.1 MMOL/L (ref 22–29)
CREAT SERPL-MCNC: 0.99 MG/DL (ref 0.57–1)
GFR SERPL CREATININE-BSD FRML MDRD: 62 ML/MIN/1.73
GLOBULIN UR ELPH-MCNC: 2.9 GM/DL
GLUCOSE SERPL-MCNC: 86 MG/DL (ref 65–99)
HDLC SERPL-MCNC: 59 MG/DL (ref 40–60)
LDLC SERPL CALC-MCNC: 114 MG/DL (ref 0–100)
LDLC/HDLC SERPL: 1.89 {RATIO}
POTASSIUM SERPL-SCNC: 4.1 MMOL/L (ref 3.5–5.2)
PROT SERPL-MCNC: 7.2 G/DL (ref 6–8.5)
SODIUM SERPL-SCNC: 137 MMOL/L (ref 136–145)
TRIGL SERPL-MCNC: 106 MG/DL (ref 0–150)
VLDLC SERPL-MCNC: 19 MG/DL (ref 5–40)

## 2021-09-14 PROCEDURE — 36415 COLL VENOUS BLD VENIPUNCTURE: CPT | Performed by: PHYSICIAN ASSISTANT

## 2021-09-14 PROCEDURE — 80053 COMPREHEN METABOLIC PANEL: CPT | Performed by: PHYSICIAN ASSISTANT

## 2021-09-14 PROCEDURE — 80061 LIPID PANEL: CPT | Performed by: PHYSICIAN ASSISTANT

## 2021-10-07 DIAGNOSIS — F41.9 ANXIETY: Chronic | ICD-10-CM

## 2021-11-08 DIAGNOSIS — R79.89 ABNORMAL THYROID BLOOD TEST: Primary | ICD-10-CM

## 2021-11-10 DIAGNOSIS — F41.9 ANXIETY: Chronic | ICD-10-CM

## 2021-11-10 NOTE — TELEPHONE ENCOUNTER
Pharmacy refill request for Sertraline 50mg tablets. Last refilled on 10/07/2021. Next appointment with PCP is 12/20/2021

## 2021-12-08 RX ORDER — NORETHINDRONE ACETATE AND ETHINYL ESTRADIOL AND FERROUS FUMARATE 1MG-20(24)
KIT ORAL
Qty: 28 TABLET | Refills: 0 | Status: SHIPPED | OUTPATIENT
Start: 2021-12-08 | End: 2022-01-05

## 2021-12-09 ENCOUNTER — LAB (OUTPATIENT)
Dept: LAB | Facility: HOSPITAL | Age: 41
End: 2021-12-09

## 2021-12-09 DIAGNOSIS — R79.89 ABNORMAL THYROID BLOOD TEST: ICD-10-CM

## 2021-12-09 DIAGNOSIS — E78.5 HYPERLIPIDEMIA, UNSPECIFIED HYPERLIPIDEMIA TYPE: ICD-10-CM

## 2021-12-09 LAB
T4 FREE SERPL-MCNC: 1.07 NG/DL (ref 0.93–1.7)
TSH SERPL DL<=0.05 MIU/L-ACNC: 2.98 UIU/ML (ref 0.27–4.2)

## 2021-12-09 PROCEDURE — 36415 COLL VENOUS BLD VENIPUNCTURE: CPT

## 2021-12-09 PROCEDURE — 80053 COMPREHEN METABOLIC PANEL: CPT

## 2021-12-09 PROCEDURE — 84439 ASSAY OF FREE THYROXINE: CPT

## 2021-12-09 PROCEDURE — 80061 LIPID PANEL: CPT

## 2021-12-09 PROCEDURE — 84443 ASSAY THYROID STIM HORMONE: CPT

## 2021-12-10 DIAGNOSIS — E78.5 HYPERLIPIDEMIA, UNSPECIFIED HYPERLIPIDEMIA TYPE: Primary | ICD-10-CM

## 2021-12-10 LAB
ALBUMIN SERPL-MCNC: 4.2 G/DL (ref 3.5–5.2)
ALBUMIN/GLOB SERPL: 1.6 G/DL
ALP SERPL-CCNC: 47 U/L (ref 39–117)
ALT SERPL W P-5'-P-CCNC: 18 U/L (ref 1–33)
ANION GAP SERPL CALCULATED.3IONS-SCNC: 12.3 MMOL/L (ref 5–15)
AST SERPL-CCNC: 21 U/L (ref 1–32)
BILIRUB SERPL-MCNC: 0.2 MG/DL (ref 0–1.2)
BUN SERPL-MCNC: 7 MG/DL (ref 6–20)
BUN/CREAT SERPL: 7.4 (ref 7–25)
CALCIUM SPEC-SCNC: 9.1 MG/DL (ref 8.6–10.5)
CHLORIDE SERPL-SCNC: 106 MMOL/L (ref 98–107)
CHOLEST SERPL-MCNC: 178 MG/DL (ref 0–200)
CO2 SERPL-SCNC: 22.7 MMOL/L (ref 22–29)
CREAT SERPL-MCNC: 0.94 MG/DL (ref 0.57–1)
GFR SERPL CREATININE-BSD FRML MDRD: 66 ML/MIN/1.73
GLOBULIN UR ELPH-MCNC: 2.7 GM/DL
GLUCOSE SERPL-MCNC: 81 MG/DL (ref 65–99)
HDLC SERPL-MCNC: 59 MG/DL (ref 40–60)
LDLC SERPL CALC-MCNC: 105 MG/DL (ref 0–100)
LDLC/HDLC SERPL: 1.77 {RATIO}
POTASSIUM SERPL-SCNC: 3.9 MMOL/L (ref 3.5–5.2)
PROT SERPL-MCNC: 6.9 G/DL (ref 6–8.5)
SODIUM SERPL-SCNC: 141 MMOL/L (ref 136–145)
TRIGL SERPL-MCNC: 73 MG/DL (ref 0–150)
VLDLC SERPL-MCNC: 14 MG/DL (ref 5–40)

## 2021-12-20 ENCOUNTER — OFFICE VISIT (OUTPATIENT)
Dept: FAMILY MEDICINE CLINIC | Facility: CLINIC | Age: 41
End: 2021-12-20

## 2021-12-20 VITALS
HEIGHT: 64 IN | HEART RATE: 75 BPM | OXYGEN SATURATION: 98 % | BODY MASS INDEX: 27.69 KG/M2 | DIASTOLIC BLOOD PRESSURE: 72 MMHG | WEIGHT: 162.2 LBS | SYSTOLIC BLOOD PRESSURE: 112 MMHG | TEMPERATURE: 97.9 F

## 2021-12-20 DIAGNOSIS — E78.5 HYPERLIPIDEMIA, UNSPECIFIED HYPERLIPIDEMIA TYPE: Chronic | ICD-10-CM

## 2021-12-20 DIAGNOSIS — F41.9 ANXIETY: Chronic | ICD-10-CM

## 2021-12-20 PROCEDURE — 99214 OFFICE O/P EST MOD 30 MIN: CPT | Performed by: PHYSICIAN ASSISTANT

## 2021-12-20 RX ORDER — MULTIPLE VITAMINS W/ MINERALS TAB 9MG-400MCG
1 TAB ORAL DAILY
COMMUNITY

## 2021-12-20 RX ORDER — ROSUVASTATIN CALCIUM 5 MG/1
5 TABLET, COATED ORAL DAILY
Qty: 90 TABLET | Refills: 1 | Status: SHIPPED | OUTPATIENT
Start: 2021-12-20 | End: 2022-07-06 | Stop reason: SDUPTHER

## 2021-12-20 NOTE — PROGRESS NOTES
Chief Complaint  Chief Complaint   Patient presents with   • Anxiety     6 month follow up   • Hyperlipidemia       Subjective          Heidi Mills presents to [facility]  History of Present Illness     6 month follow up: Anxiety, HL    Anxiety: Patient is currently on Sertraline for anxiety and doing well. Patient states that symptoms are well controlled.    HL: Patient presents for management of hyperlipidemia. Patient is currently on Rosuvastatin. Patient denies muscle cramps and muscle weakness. Patient is monitoring diet to help lower lipids.    Labs: 12/09/2021; reviewed and improved.    Covid-19: Vaccinated    Flu: Vaccinated    PAP: 12/2020 ([facility])      Medical History: has a past medical history of Anemia, Anxiety (02/28/2020), Chronic allergic rhinitis, GERD (gastroesophageal reflux disease) (02/28/2020), Hyperlipidemia (02/28/2020), IBS (irritable bowel syndrome) (02/28/2020), Microscopic hematuria (03/18/2016), Pain, knee, and Screening for colon cancer (03/11/2016).   Surgical History: has a past surgical history that includes Colonoscopy (2013,2016,2020); Nasal septum surgery; Esophagogastroduodenoscopy (2016, 2019); and Tonsillectomy.   Family History: family history includes Colon cancer in an other family member; Diabetes in her maternal uncle; Hypertension in her father and mother; Stroke in her maternal grandmother.   Social History: reports that she has never smoked. She has never used smokeless tobacco. She reports that she does not drink alcohol and does not use drugs.    Allergies: Hydrocodone-acetaminophen    Health Maintenance Due   Topic Date Due   • ANNUAL PHYSICAL  Never done   • HEPATITIS C SCREENING  Never done       Immunization History   Administered Date(s) Administered   • COVID-19 (PFIZER) 10/15/2021, 11/05/2021   • Flu Vaccine Intradermal Quad 18-64YR 10/02/2021   • Flu Vaccine Quad PF >18YRS 12/11/2020   • Flu Vaccine  "Quad PF >36MO 10/20/2020   • Influenza, Unspecified 10/20/2020, 12/11/2020   • Tdap 11/06/2013       Objective     Vitals:    12/20/21 0852   BP: 112/72   BP Location: Left arm   Patient Position: Sitting   Pulse: 75   Temp: 97.9 °F (36.6 °C)   SpO2: 98%   Weight: 73.6 kg (162 lb 3.2 oz)   Height: 162.6 cm (64\")     Body mass index is 27.84 kg/m².       Physical Exam  Vitals and nursing note reviewed.   Constitutional:       Appearance: Normal appearance.   HENT:      Head: Normocephalic and atraumatic.   Neck:      Vascular: No carotid bruit.      Comments: Thyroid : gland size normal, nontender, no nodules or masses present on palpation   Cardiovascular:      Rate and Rhythm: Normal rate and regular rhythm.      Pulses: Normal pulses.      Heart sounds: Normal heart sounds.   Pulmonary:      Effort: Pulmonary effort is normal.      Breath sounds: Normal breath sounds.   Musculoskeletal:      Cervical back: Neck supple. No tenderness.   Lymphadenopathy:      Cervical: No cervical adenopathy.   Neurological:      Mental Status: She is alert.   Psychiatric:         Mood and Affect: Mood normal.         Behavior: Behavior normal.           Result Review :                           Assessment and Plan      Diagnoses and all orders for this visit:    1. Hyperlipidemia, unspecified hyperlipidemia type  Comments:  Stable on Crestor 5mg daily; labs reviewed; follow up in 6mths.  Orders:  -     rosuvastatin (Crestor) 5 MG tablet; Take 1 tablet by mouth Daily.  Dispense: 90 tablet; Refill: 1    2. Anxiety  Comments:  Stable on Zoloft 50mg daily;  continue current medications; follow up in 6mths.  Orders:  -     sertraline (ZOLOFT) 50 MG tablet; Take 1 tablet by mouth Daily.  Dispense: 90 tablet; Refill: 1            Follow Up     Return in about 6 months (around 6/20/2022).    Patient was given instructions and counseling regarding her condition or for health maintenance advice. Please see specific information pulled into " the AVS if appropriate.

## 2022-01-03 ENCOUNTER — OFFICE VISIT (OUTPATIENT)
Dept: FAMILY MEDICINE CLINIC | Facility: CLINIC | Age: 42
End: 2022-01-03

## 2022-01-03 VITALS
WEIGHT: 162 LBS | BODY MASS INDEX: 27.66 KG/M2 | OXYGEN SATURATION: 98 % | HEIGHT: 64 IN | HEART RATE: 78 BPM | DIASTOLIC BLOOD PRESSURE: 87 MMHG | SYSTOLIC BLOOD PRESSURE: 140 MMHG

## 2022-01-03 DIAGNOSIS — J06.9 UPPER RESPIRATORY TRACT INFECTION, UNSPECIFIED TYPE: ICD-10-CM

## 2022-01-03 DIAGNOSIS — H66.90 ACUTE OTITIS MEDIA, UNSPECIFIED OTITIS MEDIA TYPE: Primary | ICD-10-CM

## 2022-01-03 PROCEDURE — 99213 OFFICE O/P EST LOW 20 MIN: CPT | Performed by: NURSE PRACTITIONER

## 2022-01-03 RX ORDER — AMOXICILLIN 875 MG/1
875 TABLET, COATED ORAL 2 TIMES DAILY
Qty: 20 TABLET | Refills: 0 | Status: SHIPPED | OUTPATIENT
Start: 2022-01-03 | End: 2022-07-06

## 2022-01-03 RX ORDER — METHYLPREDNISOLONE 4 MG/1
TABLET ORAL
Qty: 1 EACH | Refills: 0 | Status: SHIPPED | OUTPATIENT
Start: 2022-01-03 | End: 2022-07-06

## 2022-01-03 RX ORDER — FLUCONAZOLE 150 MG/1
150 TABLET ORAL ONCE
Qty: 1 TABLET | Refills: 0 | Status: SHIPPED | OUTPATIENT
Start: 2022-01-03 | End: 2022-01-03

## 2022-01-03 NOTE — PROGRESS NOTES
Chief Complaint  Ear ache, sore throat   Subjective          Heidi Mills presents to [facility] for   History of Present Illness   Earache (both, most pain in left), Sore Throat, and Nasal Congestion, onset few days. Denies any fever or chills - states did have temp 99.0 yesterday-.   No known exposure for flu, COVID, or strep.  No cough, no shortness of breath.    Medical History  Past Medical History:   Diagnosis Date   • Anemia    • Anxiety 02/28/2020   • Chronic allergic rhinitis    • GERD (gastroesophageal reflux disease) 02/28/2020   • Hyperlipidemia 02/28/2020   • IBS (irritable bowel syndrome) 02/28/2020   • Microscopic hematuria 03/18/2016   • Pain, knee    • Screening for colon cancer 03/11/2016     Surgical History  Past Surgical History:   Procedure Laterality Date   • COLONOSCOPY  2013,2016,2020   • ENDOSCOPY  2016, 2019   • NASAL SEPTUM SURGERY     • TONSILLECTOMY       Social History  Social History     Socioeconomic History   • Marital status:    Tobacco Use   • Smoking status: Never Smoker   • Smokeless tobacco: Never Used   Vaping Use   • Vaping Use: Never used   Substance and Sexual Activity   • Alcohol use: Never   • Drug use: Never   • Sexual activity: Defer       Current Outpatient Medications:   •  Aurovela 24 FE 1-20 MG-MCG(24) per tablet, TAKE 1 TABLET BY MOUTH EVERY DAY, Disp: 28 tablet, Rfl: 0  •  cetirizine (ZyrTEC Allergy) 10 MG tablet, Take 1 tablet by mouth Daily., Disp: , Rfl:   •  cyanocobalamin (VITAMIN B-12) 1000 MCG tablet, Take 1 tablet by mouth Daily., Disp: , Rfl:   •  dicyclomine (BENTYL) 20 MG tablet, Take 1 tablet by mouth Daily., Disp: , Rfl:   •  multivitamin with minerals tablet tablet, Take 1 tablet by mouth Daily., Disp: , Rfl:   •  rosuvastatin (Crestor) 5 MG tablet, Take 1 tablet by mouth Daily., Disp: 90 tablet, Rfl: 1  •  sertraline (ZOLOFT) 50 MG tablet, Take 1 tablet by mouth Daily., Disp: 90 tablet, Rfl: 1  •   "amoxicillin (AMOXIL) 875 MG tablet, Take 1 tablet by mouth 2 (Two) Times a Day., Disp: 20 tablet, Rfl: 0  •  fluconazole (Diflucan) 150 MG tablet, Take 1 tablet by mouth 1 (One) Time for 1 dose., Disp: 1 tablet, Rfl: 0  •  methylPREDNISolone (MEDROL) 4 MG dose pack, Take as directed on package instructions., Disp: 1 each, Rfl: 0    Review of Systems     Objective     /87   Pulse 78   Ht 162.6 cm (64\")   Wt 73.5 kg (162 lb)   SpO2 98%   BMI 27.81 kg/m²     Body mass index is 27.81 kg/m².     BP re-check 124/72       Physical Exam  Vitals reviewed.   Constitutional:       Appearance: Normal appearance. She is well-developed.   HENT:      Head: Normocephalic and atraumatic.      Right Ear: Tympanic membrane, ear canal and external ear normal.      Left Ear: Ear canal and external ear normal.      Ears:      Comments: Fluid noted left TM, moderately erythematous     Nose: Congestion and rhinorrhea present.      Mouth/Throat:      Pharynx: No oropharyngeal exudate or posterior oropharyngeal erythema.   Eyes:      Conjunctiva/sclera: Conjunctivae normal.      Pupils: Pupils are equal, round, and reactive to light.   Cardiovascular:      Rate and Rhythm: Normal rate and regular rhythm.      Heart sounds: No murmur heard.  No friction rub. No gallop.    Pulmonary:      Effort: Pulmonary effort is normal.      Breath sounds: Normal breath sounds. No wheezing or rhonchi.   Skin:     General: Skin is warm and dry.   Neurological:      Mental Status: She is alert and oriented to person, place, and time.      Cranial Nerves: No cranial nerve deficit.   Psychiatric:         Mood and Affect: Mood and affect normal.         Behavior: Behavior normal.         Thought Content: Thought content normal.         Judgment: Judgment normal.         Result Review :     The following data was reviewed by: BENNIE Rodriguez on 01/03/2022:                         Assessment:  Diagnoses and all orders for this visit:    1. Acute " otitis media, unspecified otitis media type (Primary)  -     amoxicillin (AMOXIL) 875 MG tablet; Take 1 tablet by mouth 2 (Two) Times a Day.  Dispense: 20 tablet; Refill: 0  -     methylPREDNISolone (MEDROL) 4 MG dose pack; Take as directed on package instructions.  Dispense: 1 each; Refill: 0  -     fluconazole (Diflucan) 150 MG tablet; Take 1 tablet by mouth 1 (One) Time for 1 dose.  Dispense: 1 tablet; Refill: 0    2. Upper respiratory tract infection, unspecified type  Comments:  Patient declines Covid testing at this time.                Follow Up     Return if symptoms worsen or fail to improve.    Patient was given instructions and counseling regarding her condition or for health maintenance advice. Please see specific information pulled into the AVS if appropriate.     Evie Clifton, APRN  01/03/2022

## 2022-01-05 RX ORDER — NORETHINDRONE ACETATE AND ETHINYL ESTRADIOL AND FERROUS FUMARATE 1MG-20(24)
KIT ORAL
Qty: 28 TABLET | Refills: 1 | Status: SHIPPED | OUTPATIENT
Start: 2022-01-05 | End: 2022-02-02 | Stop reason: SDUPTHER

## 2022-02-02 ENCOUNTER — TELEPHONE (OUTPATIENT)
Dept: OBSTETRICS AND GYNECOLOGY | Facility: CLINIC | Age: 42
End: 2022-02-02

## 2022-02-02 RX ORDER — NORETHINDRONE ACETATE AND ETHINYL ESTRADIOL AND FERROUS FUMARATE 1MG-20(24)
1 KIT ORAL DAILY
Qty: 28 TABLET | Refills: 1 | Status: SHIPPED | OUTPATIENT
Start: 2022-02-02 | End: 2022-02-28 | Stop reason: SDUPTHER

## 2022-02-28 ENCOUNTER — OFFICE VISIT (OUTPATIENT)
Dept: OBSTETRICS AND GYNECOLOGY | Facility: CLINIC | Age: 42
End: 2022-02-28

## 2022-02-28 VITALS
HEIGHT: 63 IN | BODY MASS INDEX: 28.35 KG/M2 | WEIGHT: 160 LBS | SYSTOLIC BLOOD PRESSURE: 127 MMHG | HEART RATE: 85 BPM | DIASTOLIC BLOOD PRESSURE: 81 MMHG

## 2022-02-28 DIAGNOSIS — Z30.41 ENCOUNTER FOR SURVEILLANCE OF CONTRACEPTIVE PILLS: ICD-10-CM

## 2022-02-28 DIAGNOSIS — Z01.419 WELL WOMAN EXAM WITH ROUTINE GYNECOLOGICAL EXAM: Primary | ICD-10-CM

## 2022-02-28 PROCEDURE — G0123 SCREEN CERV/VAG THIN LAYER: HCPCS | Performed by: OBSTETRICS & GYNECOLOGY

## 2022-02-28 PROCEDURE — 99395 PREV VISIT EST AGE 18-39: CPT | Performed by: OBSTETRICS & GYNECOLOGY

## 2022-02-28 PROCEDURE — 87624 HPV HI-RISK TYP POOLED RSLT: CPT | Performed by: OBSTETRICS & GYNECOLOGY

## 2022-02-28 RX ORDER — NORETHINDRONE ACETATE AND ETHINYL ESTRADIOL AND FERROUS FUMARATE 1MG-20(24)
1 KIT ORAL DAILY
Qty: 28 TABLET | Refills: 1 | Status: SHIPPED | OUTPATIENT
Start: 2022-02-28 | End: 2022-04-28 | Stop reason: SDUPTHER

## 2022-02-28 NOTE — PROGRESS NOTES
"Well Woman Visit    CC: WWE     Myriad intake: No  Previously Qualified? NO   Tobacco/Nicotine use:  No    HPI:   41 y.o. Contraception or HRT: OCP (estrogen/progesterone)  Menses:   q 0 days, lasts 0 days, changes products q 0 hrs on heaviest days.   Pain:  None    Pt has no complaints today.      History: PMHx, Meds, Allergies, PSHx, Social Hx, and POBHx all reviewed and updated.  PCP:Yi Maharaj PA     Review of Systems     /81   Pulse 85   Ht 160 cm (63\")   Wt 72.6 kg (160 lb)   LMP 2022 (Exact Date) Comment: irreg menses  BMI 28.34 kg/m²     Physical Exam  Vitals and nursing note reviewed. Exam conducted with a chaperone present.   Constitutional:       Appearance: Normal appearance.   Neck:      Thyroid: No thyroid mass or thyromegaly.   Cardiovascular:      Rate and Rhythm: Normal rate and regular rhythm.      Heart sounds: Normal heart sounds.   Pulmonary:      Effort: Pulmonary effort is normal.      Breath sounds: Normal breath sounds.   Chest:   Breasts:      Right: Normal. No mass, nipple discharge or skin change.      Left: Normal. No mass, nipple discharge or skin change.       Abdominal:      General: Abdomen is flat. Bowel sounds are normal.      Palpations: Abdomen is soft.   Genitourinary:     General: Normal vulva.      Exam position: Lithotomy position.      Labia:         Right: No lesion.         Left: No lesion.       Urethra: No prolapse or urethral lesion.      Vagina: Normal.      Cervix: Normal.      Uterus: Normal.       Adnexa: Right adnexa normal and left adnexa normal.      Rectum: No external hemorrhoid.   Musculoskeletal:      Cervical back: Full passive range of motion without pain.   Neurological:      Mental Status: She is alert.         ASSESSMENT AND PLAN:  WWE    Diagnoses and all orders for this visit:    1. Well woman exam with routine gynecological exam (Primary)  -     Mammo Screening Digital Tomosynthesis Bilateral With CAD; Future  -     " IgP, Aptima HPV    2. Encounter for surveillance of contraceptive pills  Assessment & Plan:  Experiencing amenorrhea on current OCP  Would like to continue OCPs    Orders:  -     norethindrone-ethinyl estradiol-ferrous fumarate (Aurovela 24 FE) 1-20 MG-MCG(24) per tablet; Take 1 tablet by mouth Daily.  Dispense: 28 tablet; Refill: 1        Counseling:     All BC Options R/B/A/SE/E of each reviewed  OCP/Hormone use risk HOLLIS  Track menses, RTO IF <q21d, >7d long, or heavy  Recommend daily calcium and vitamin D  Recommend weightbearing exercise  Discussed self breast exams  Preventative:   MMG  Follow up PCP/Specialist PMHx and Labs  BREAST HEALTH- Monthly self breast exam importance and how to reviewed. MMG and/or MRI (prn) reviewed per society guidelines and her individual history. Mammo/MRI screen: Pt will call to schedule..  CERVICAL CANCER Screening- Reviewed current ASCCP guidelines for screening w and wo cotest HPV, age specific.  Screen: Updated today.  VACCINATIONS Recommended: COVID and booster PRN, Flu annually.  Importance discussed, risk being unvaccinated reviewed.  Questions answered  Smoking status- NON SMOKER.  Importance of avoiding second hand smoke.  Follow up PCP/Specialist PMHx and Labs    s/p COVID vaccine        She understands the importance of having any ordered tests to be performed in a timely fashion.  The risks of not performing them include, but are not limited to, advanced cancer stages, bone loss from osteoporosis and/or subsequent increase in morbidity and/or mortality.  She is encouraged to review her results online and/or contact or office if she has questions.     Follow Up:  Return in about 1 year (around 2/28/2023).        Pinky Webb MD  02/28/2022

## 2022-03-04 LAB
CYTOLOGIST CVX/VAG CYTO: NORMAL
CYTOLOGY CVX/VAG DOC CYTO: NORMAL
CYTOLOGY CVX/VAG DOC THIN PREP: NORMAL
DX ICD CODE: NORMAL
HIV 1 & 2 AB SER-IMP: NORMAL
HPV I/H RISK 4 DNA CVX QL PROBE+SIG AMP: NEGATIVE
OTHER STN SPEC: NORMAL
STAT OF ADQ CVX/VAG CYTO-IMP: NORMAL

## 2022-03-11 RX ORDER — DICYCLOMINE HCL 20 MG
TABLET ORAL
Qty: 90 TABLET | Refills: 5 | OUTPATIENT
Start: 2022-03-11

## 2022-03-14 DIAGNOSIS — K58.9 IRRITABLE BOWEL SYNDROME, UNSPECIFIED TYPE: Primary | ICD-10-CM

## 2022-03-14 RX ORDER — DICYCLOMINE HCL 20 MG
20 TABLET ORAL EVERY 6 HOURS PRN
Qty: 60 TABLET | Refills: 1 | Status: SHIPPED | OUTPATIENT
Start: 2022-03-14

## 2022-04-06 DIAGNOSIS — F41.9 ANXIETY: Chronic | ICD-10-CM

## 2022-04-06 DIAGNOSIS — E78.5 HYPERLIPIDEMIA, UNSPECIFIED HYPERLIPIDEMIA TYPE: Chronic | ICD-10-CM

## 2022-04-06 RX ORDER — ROSUVASTATIN CALCIUM 5 MG/1
5 TABLET, COATED ORAL DAILY
Qty: 90 TABLET | Refills: 1 | OUTPATIENT
Start: 2022-04-06

## 2022-04-06 NOTE — TELEPHONE ENCOUNTER
LVM advising pt should have refills at pharmacy to get her to her upcoming appt. And if she has any further questions to call the office.

## 2022-04-06 NOTE — TELEPHONE ENCOUNTER
Incoming Refill Request      Medication requested (name and dose):  rosuvastatin (Crestor) 5 MG tablet  sertraline (ZOLOFT) 50 MG tablet    Pharmacy where request should be sent:   [facility] - [place], [address] - [phone]  - [phone] FX        Additional details provided by patient:       Best call back number:    Does the patient have less than a 3 day supply:  [] Yes  [x] No    Pavan STAFFORD Rep  04/06/22, 13:52 EDT

## 2022-04-28 ENCOUNTER — TELEPHONE (OUTPATIENT)
Dept: OBSTETRICS AND GYNECOLOGY | Facility: CLINIC | Age: 42
End: 2022-04-28

## 2022-04-28 DIAGNOSIS — Z30.41 ENCOUNTER FOR SURVEILLANCE OF CONTRACEPTIVE PILLS: ICD-10-CM

## 2022-04-28 RX ORDER — NORETHINDRONE ACETATE AND ETHINYL ESTRADIOL AND FERROUS FUMARATE 1MG-20(24)
1 KIT ORAL DAILY
Qty: 28 TABLET | Refills: 11 | Status: SHIPPED | OUTPATIENT
Start: 2022-04-28 | End: 2022-07-19 | Stop reason: SDUPTHER

## 2022-07-06 ENCOUNTER — OFFICE VISIT (OUTPATIENT)
Dept: FAMILY MEDICINE CLINIC | Facility: CLINIC | Age: 42
End: 2022-07-06

## 2022-07-06 VITALS
OXYGEN SATURATION: 97 % | BODY MASS INDEX: 28.53 KG/M2 | HEART RATE: 71 BPM | DIASTOLIC BLOOD PRESSURE: 70 MMHG | WEIGHT: 161 LBS | SYSTOLIC BLOOD PRESSURE: 124 MMHG | HEIGHT: 63 IN

## 2022-07-06 DIAGNOSIS — Z11.59 NEED FOR HEPATITIS C SCREENING TEST: Primary | ICD-10-CM

## 2022-07-06 DIAGNOSIS — E61.1 IRON DEFICIENCY: ICD-10-CM

## 2022-07-06 DIAGNOSIS — E78.5 HYPERLIPIDEMIA, UNSPECIFIED HYPERLIPIDEMIA TYPE: Chronic | ICD-10-CM

## 2022-07-06 DIAGNOSIS — F41.9 ANXIETY: Chronic | ICD-10-CM

## 2022-07-06 PROCEDURE — 99214 OFFICE O/P EST MOD 30 MIN: CPT | Performed by: PHYSICIAN ASSISTANT

## 2022-07-06 RX ORDER — ROSUVASTATIN CALCIUM 5 MG/1
5 TABLET, COATED ORAL DAILY
Qty: 90 TABLET | Refills: 1 | Status: SHIPPED | OUTPATIENT
Start: 2022-07-06 | End: 2022-10-12 | Stop reason: SDUPTHER

## 2022-07-06 RX ORDER — FLUTICASONE PROPIONATE 50 MCG
SPRAY, SUSPENSION (ML) NASAL
COMMUNITY
Start: 2022-03-29 | End: 2022-12-19

## 2022-07-06 NOTE — PROGRESS NOTES
Chief Complaint  Chief Complaint   Patient presents with   • Anxiety   • Hyperlipidemia       Subjective          Heidi Mills presents to [facility]  History of Present Illness    6 month follow up: Anxiety, HL     Anxiety: Patient is currently on Sertraline for anxiety and doing well. Patient is requesting a refill today. Patient states that symptoms are well controlled.     HL: Patient presents for management of hyperlipidemia. Patient is currently on Rosuvastatin. Patient is requesting a refill today. Patient denies muscle cramps and muscle weakness. Patient is monitoring diet to help lower lipids.    Medical History: has a past medical history of Anemia, Anxiety (02/28/2020), Chronic allergic rhinitis, GERD (gastroesophageal reflux disease) (02/28/2020), Hyperlipidemia (02/28/2020), IBS (irritable bowel syndrome) (02/28/2020), Microscopic hematuria (03/18/2016), Pain, knee, and Screening for colon cancer (03/11/2016).   Surgical History: has a past surgical history that includes Colonoscopy (2013,2016,2020); Nasal septum surgery; Esophagogastroduodenoscopy (2016, 2019); and Tonsillectomy.   Family History: family history includes Colon cancer in an other family member; Diabetes in her maternal uncle; Hypertension in her father and mother; Stroke in her maternal grandmother.   Social History: reports that she has never smoked. She has never used smokeless tobacco. She reports that she does not drink alcohol and does not use drugs.    Allergies: Hydrocodone-acetaminophen    Health Maintenance Due   Topic Date Due   • ANNUAL PHYSICAL  Never done   • HEPATITIS C SCREENING  Never done       Immunization History   Administered Date(s) Administered   • COVID-19 (PFIZER) PURPLE CAP 10/15/2021, 11/05/2021   • Flu Vaccine Intradermal Quad 18-64YR 10/02/2021   • Flu Vaccine Quad PF >36MO 10/20/2020, 10/25/2021   • Fluzone Split Quad (Multi-dose) 12/11/2020   • Influenza, Unspecified  "10/20/2020, 12/11/2020   • Tdap 11/06/2013       Objective     Vitals:    07/06/22 1054   BP: 124/70   Pulse: 71   SpO2: 97%   Weight: 73 kg (161 lb)   Height: 160 cm (63\")     Body mass index is 28.52 kg/m².     Wt Readings from Last 3 Encounters:   07/06/22 73 kg (161 lb)   02/28/22 72.6 kg (160 lb)   01/03/22 73.5 kg (162 lb)     BP Readings from Last 3 Encounters:   07/06/22 124/70   02/28/22 127/81   01/03/22 140/87         Patient Care Team:  Yi Maharaj PA as PCP - General (Family Medicine)    Physical Exam  Vitals and nursing note reviewed.   Constitutional:       Appearance: Normal appearance.   HENT:      Head: Normocephalic and atraumatic.   Neck:      Vascular: No carotid bruit.      Comments: Thyroid : gland size normal, nontender, no nodules or masses present on palpation   Cardiovascular:      Rate and Rhythm: Normal rate and regular rhythm.      Pulses: Normal pulses.      Heart sounds: Normal heart sounds.   Pulmonary:      Effort: Pulmonary effort is normal.      Breath sounds: Normal breath sounds.   Musculoskeletal:      Cervical back: Neck supple. No tenderness.   Lymphadenopathy:      Cervical: No cervical adenopathy.   Neurological:      Mental Status: She is alert.   Psychiatric:         Mood and Affect: Mood normal.         Behavior: Behavior normal.           Result Review :                           Assessment and Plan      Diagnoses and all orders for this visit:    1. Need for hepatitis C screening test (Primary)  -     Hepatitis C Antibody; Future  -     Hepatitis C Antibody    2. Hyperlipidemia, unspecified hyperlipidemia type  Comments:  Stable on Crestor 5mg daily; check labs; follow up in 6mths.  Orders:  -     Comprehensive Metabolic Panel; Future  -     Lipid Panel; Future  -     rosuvastatin (Crestor) 5 MG tablet; Take 1 tablet by mouth Daily.  Dispense: 90 tablet; Refill: 1  -     Lipid Panel  -     Comprehensive Metabolic Panel    3. Anxiety  Comments:  Stable on " Zoloft 50mg daily;  continue current medications; follow up in 6mths.  Orders:  -     TSH; Future  -     T4, Free; Future  -     sertraline (ZOLOFT) 50 MG tablet; Take 1 tablet by mouth Daily.  Dispense: 90 tablet; Refill: 1  -     T4, Free  -     TSH    4. Iron deficiency  -     CBC & Differential; Future  -     Iron Profile; Future  -     Iron Profile  -     CBC & Differential            Follow Up     Return in about 6 months (around 1/6/2023).    Patient was given instructions and counseling regarding her condition or for health maintenance advice. Please see specific information pulled into the AVS if appropriate.

## 2022-07-19 ENCOUNTER — TELEPHONE (OUTPATIENT)
Dept: OBSTETRICS AND GYNECOLOGY | Facility: CLINIC | Age: 42
End: 2022-07-19

## 2022-07-19 DIAGNOSIS — Z30.41 ENCOUNTER FOR SURVEILLANCE OF CONTRACEPTIVE PILLS: ICD-10-CM

## 2022-07-19 RX ORDER — NORETHINDRONE ACETATE AND ETHINYL ESTRADIOL AND FERROUS FUMARATE 1MG-20(24)
1 KIT ORAL DAILY
Qty: 28 TABLET | Refills: 11 | Status: SHIPPED | OUTPATIENT
Start: 2022-07-19 | End: 2022-11-08 | Stop reason: SDUPTHER

## 2022-07-25 ENCOUNTER — PATIENT MESSAGE (OUTPATIENT)
Dept: FAMILY MEDICINE CLINIC | Facility: CLINIC | Age: 42
End: 2022-07-25

## 2022-07-25 DIAGNOSIS — J30.9 ALLERGIC RHINITIS, UNSPECIFIED SEASONALITY, UNSPECIFIED TRIGGER: Primary | ICD-10-CM

## 2022-07-26 RX ORDER — MONTELUKAST SODIUM 10 MG/1
10 TABLET ORAL NIGHTLY
Qty: 90 TABLET | Refills: 0 | Status: SHIPPED | OUTPATIENT
Start: 2022-07-26 | End: 2022-12-19 | Stop reason: SDUPTHER

## 2022-07-26 NOTE — TELEPHONE ENCOUNTER
From: Heidi Mills  To: FIOR Wyatt  Sent: 7/25/2022 12:31 PM EDT  Subject: allergies    I have had lots of drainage and my ears are popping. Sneezing and coughing as well. Could we try that claritin prescription? I believe that is what you said.

## 2022-08-22 ENCOUNTER — HOSPITAL ENCOUNTER (OUTPATIENT)
Dept: MAMMOGRAPHY | Facility: HOSPITAL | Age: 42
Discharge: HOME | End: 2022-08-22
Admitting: OBSTETRICS & GYNECOLOGY

## 2022-08-22 ENCOUNTER — LAB (OUTPATIENT)
Dept: LAB | Facility: HOSPITAL | Age: 42
End: 2022-08-22

## 2022-08-22 DIAGNOSIS — R92.8 ABNORMAL MAMMOGRAM: Primary | ICD-10-CM

## 2022-08-22 DIAGNOSIS — Z01.419 WELL WOMAN EXAM WITH ROUTINE GYNECOLOGICAL EXAM: ICD-10-CM

## 2022-08-22 LAB
ALBUMIN SERPL-MCNC: 4.2 G/DL (ref 3.5–5.2)
ALBUMIN/GLOB SERPL: 1.7 G/DL
ALP SERPL-CCNC: 56 U/L (ref 39–117)
ALT SERPL W P-5'-P-CCNC: 15 U/L (ref 1–33)
ANION GAP SERPL CALCULATED.3IONS-SCNC: 12.1 MMOL/L (ref 5–15)
AST SERPL-CCNC: 21 U/L (ref 1–32)
BASOPHILS # BLD AUTO: 0.05 10*3/MM3 (ref 0–0.2)
BASOPHILS NFR BLD AUTO: 0.6 % (ref 0–1.5)
BILIRUB SERPL-MCNC: 0.2 MG/DL (ref 0–1.2)
BUN SERPL-MCNC: 10 MG/DL (ref 6–20)
BUN/CREAT SERPL: 10.9 (ref 7–25)
CALCIUM SPEC-SCNC: 9 MG/DL (ref 8.6–10.5)
CHLORIDE SERPL-SCNC: 103 MMOL/L (ref 98–107)
CHOLEST SERPL-MCNC: 189 MG/DL (ref 0–200)
CO2 SERPL-SCNC: 24.9 MMOL/L (ref 22–29)
CREAT SERPL-MCNC: 0.92 MG/DL (ref 0.57–1)
DEPRECATED RDW RBC AUTO: 37.3 FL (ref 37–54)
EGFRCR SERPLBLD CKD-EPI 2021: 80.9 ML/MIN/1.73
EOSINOPHIL # BLD AUTO: 0.15 10*3/MM3 (ref 0–0.4)
EOSINOPHIL NFR BLD AUTO: 1.9 % (ref 0.3–6.2)
ERYTHROCYTE [DISTWIDTH] IN BLOOD BY AUTOMATED COUNT: 12.4 % (ref 12.3–15.4)
GLOBULIN UR ELPH-MCNC: 2.5 GM/DL
GLUCOSE SERPL-MCNC: 75 MG/DL (ref 65–99)
HCT VFR BLD AUTO: 39.6 % (ref 34–46.6)
HCV AB SER DONR QL: NORMAL
HDLC SERPL-MCNC: 59 MG/DL (ref 40–60)
HGB BLD-MCNC: 13.5 G/DL (ref 12–15.9)
IMM GRANULOCYTES # BLD AUTO: 0.01 10*3/MM3 (ref 0–0.05)
IMM GRANULOCYTES NFR BLD AUTO: 0.1 % (ref 0–0.5)
IRON 24H UR-MRATE: 86 MCG/DL (ref 37–145)
IRON SATN MFR SERPL: 18 % (ref 20–50)
LDLC SERPL CALC-MCNC: 110 MG/DL (ref 0–100)
LDLC/HDLC SERPL: 1.83 {RATIO}
LYMPHOCYTES # BLD AUTO: 3.33 10*3/MM3 (ref 0.7–3.1)
LYMPHOCYTES NFR BLD AUTO: 41.1 % (ref 19.6–45.3)
MCH RBC QN AUTO: 28.5 PG (ref 26.6–33)
MCHC RBC AUTO-ENTMCNC: 34.1 G/DL (ref 31.5–35.7)
MCV RBC AUTO: 83.5 FL (ref 79–97)
MONOCYTES # BLD AUTO: 0.66 10*3/MM3 (ref 0.1–0.9)
MONOCYTES NFR BLD AUTO: 8.1 % (ref 5–12)
NEUTROPHILS NFR BLD AUTO: 3.9 10*3/MM3 (ref 1.7–7)
NEUTROPHILS NFR BLD AUTO: 48.2 % (ref 42.7–76)
NRBC BLD AUTO-RTO: 0 /100 WBC (ref 0–0.2)
PLATELET # BLD AUTO: 278 10*3/MM3 (ref 140–450)
PMV BLD AUTO: 11 FL (ref 6–12)
POTASSIUM SERPL-SCNC: 3.9 MMOL/L (ref 3.5–5.2)
PROT SERPL-MCNC: 6.7 G/DL (ref 6–8.5)
RBC # BLD AUTO: 4.74 10*6/MM3 (ref 3.77–5.28)
SODIUM SERPL-SCNC: 140 MMOL/L (ref 136–145)
T4 FREE SERPL-MCNC: 1.05 NG/DL (ref 0.93–1.7)
TIBC SERPL-MCNC: 481 MCG/DL (ref 298–536)
TRANSFERRIN SERPL-MCNC: 323 MG/DL (ref 200–360)
TRIGL SERPL-MCNC: 110 MG/DL (ref 0–150)
TSH SERPL DL<=0.05 MIU/L-ACNC: 3.55 UIU/ML (ref 0.27–4.2)
VLDLC SERPL-MCNC: 20 MG/DL (ref 5–40)
WBC NRBC COR # BLD: 8.1 10*3/MM3 (ref 3.4–10.8)

## 2022-08-22 PROCEDURE — 83540 ASSAY OF IRON: CPT | Performed by: PHYSICIAN ASSISTANT

## 2022-08-22 PROCEDURE — 80061 LIPID PANEL: CPT | Performed by: PHYSICIAN ASSISTANT

## 2022-08-22 PROCEDURE — 77063 BREAST TOMOSYNTHESIS BI: CPT

## 2022-08-22 PROCEDURE — 80050 GENERAL HEALTH PANEL: CPT | Performed by: PHYSICIAN ASSISTANT

## 2022-08-22 PROCEDURE — 77067 SCR MAMMO BI INCL CAD: CPT

## 2022-08-22 PROCEDURE — 36415 COLL VENOUS BLD VENIPUNCTURE: CPT | Performed by: PHYSICIAN ASSISTANT

## 2022-08-22 PROCEDURE — 84439 ASSAY OF FREE THYROXINE: CPT | Performed by: PHYSICIAN ASSISTANT

## 2022-08-22 PROCEDURE — 84466 ASSAY OF TRANSFERRIN: CPT | Performed by: PHYSICIAN ASSISTANT

## 2022-08-22 PROCEDURE — 86803 HEPATITIS C AB TEST: CPT | Performed by: PHYSICIAN ASSISTANT

## 2022-08-23 ENCOUNTER — TELEPHONE (OUTPATIENT)
Dept: OBSTETRICS AND GYNECOLOGY | Facility: CLINIC | Age: 42
End: 2022-08-23

## 2022-08-23 NOTE — TELEPHONE ENCOUNTER
----- Message from Pinky Webb MD sent at 8/22/2022  8:47 AM EDT -----  Orders in epic for additional imaging

## 2022-08-23 NOTE — TELEPHONE ENCOUNTER
Patient called back and advised of results and recommendations. She understands the scheduling department will be calling her to set up the additional imaging.

## 2022-09-13 ENCOUNTER — HOSPITAL ENCOUNTER (OUTPATIENT)
Dept: MAMMOGRAPHY | Facility: HOSPITAL | Age: 42
Discharge: HOME | End: 2022-09-13

## 2022-09-13 ENCOUNTER — HOSPITAL ENCOUNTER (OUTPATIENT)
Dept: ULTRASOUND IMAGING | Facility: HOSPITAL | Age: 42
Discharge: HOME | End: 2022-09-13

## 2022-09-13 DIAGNOSIS — R92.8 ABNORMAL MAMMOGRAM: ICD-10-CM

## 2022-09-13 PROCEDURE — G0279 TOMOSYNTHESIS, MAMMO: HCPCS

## 2022-09-13 PROCEDURE — 77065 DX MAMMO INCL CAD UNI: CPT

## 2022-09-13 PROCEDURE — 76642 ULTRASOUND BREAST LIMITED: CPT

## 2022-10-12 DIAGNOSIS — E78.5 HYPERLIPIDEMIA, UNSPECIFIED HYPERLIPIDEMIA TYPE: Chronic | ICD-10-CM

## 2022-10-13 RX ORDER — ROSUVASTATIN CALCIUM 5 MG/1
5 TABLET, COATED ORAL DAILY
Qty: 90 TABLET | Refills: 1 | Status: SHIPPED | OUTPATIENT
Start: 2022-10-13

## 2022-10-18 ENCOUNTER — TELEPHONE (OUTPATIENT)
Dept: OBSTETRICS AND GYNECOLOGY | Facility: CLINIC | Age: 42
End: 2022-10-18

## 2022-10-18 NOTE — TELEPHONE ENCOUNTER
No refills on Aurovela 24 Fe needed at this time per Bailey at [facility].  I had received a refill request, but we had renewed her bc i07/19/22.  I called, and the pt still has 4 refills left, to last until February 2023.

## 2022-11-08 ENCOUNTER — TELEPHONE (OUTPATIENT)
Dept: OBSTETRICS AND GYNECOLOGY | Facility: CLINIC | Age: 42
End: 2022-11-08

## 2022-11-08 DIAGNOSIS — Z30.41 ENCOUNTER FOR SURVEILLANCE OF CONTRACEPTIVE PILLS: ICD-10-CM

## 2022-11-08 RX ORDER — NORETHINDRONE ACETATE AND ETHINYL ESTRADIOL AND FERROUS FUMARATE 1MG-20(24)
1 KIT ORAL DAILY
Qty: 28 TABLET | Refills: 3 | Status: SHIPPED | OUTPATIENT
Start: 2022-11-08 | End: 2023-01-03 | Stop reason: SDUPTHER

## 2022-12-08 DIAGNOSIS — F41.9 ANXIETY: Chronic | ICD-10-CM

## 2022-12-19 ENCOUNTER — OFFICE VISIT (OUTPATIENT)
Dept: FAMILY MEDICINE CLINIC | Facility: CLINIC | Age: 42
End: 2022-12-19

## 2022-12-19 VITALS
WEIGHT: 162 LBS | SYSTOLIC BLOOD PRESSURE: 124 MMHG | RESPIRATION RATE: 18 BRPM | HEART RATE: 65 BPM | OXYGEN SATURATION: 99 % | BODY MASS INDEX: 28.7 KG/M2 | HEIGHT: 63 IN | DIASTOLIC BLOOD PRESSURE: 76 MMHG

## 2022-12-19 DIAGNOSIS — F41.9 ANXIETY: Chronic | ICD-10-CM

## 2022-12-19 DIAGNOSIS — J30.9 ALLERGIC RHINITIS, UNSPECIFIED SEASONALITY, UNSPECIFIED TRIGGER: Chronic | ICD-10-CM

## 2022-12-19 DIAGNOSIS — E78.5 HYPERLIPIDEMIA, UNSPECIFIED HYPERLIPIDEMIA TYPE: Primary | Chronic | ICD-10-CM

## 2022-12-19 PROCEDURE — 99214 OFFICE O/P EST MOD 30 MIN: CPT | Performed by: PHYSICIAN ASSISTANT

## 2022-12-19 RX ORDER — MONTELUKAST SODIUM 10 MG/1
10 TABLET ORAL NIGHTLY
Qty: 90 TABLET | Refills: 1 | Status: SHIPPED | OUTPATIENT
Start: 2022-12-19

## 2022-12-19 NOTE — PROGRESS NOTES
Chief Complaint  Chief Complaint   Patient presents with   • Anxiety   • Follow-up   • Hyperlipidemia   • Irritable Bowel Syndrome       Subjective          Heidi Mills presents to [facility] for 6 month follow up for HL, IBS, and anxiety.    History of Present Illness    HL: Patient presents for management of hyperlipidemia. Patient is currently on Crestor. Patient denies muscle cramps and muscle weakness. Patient is monitoring diet to help lower lipids.    IBS: Patient presents for management of IBS. Pt is currently on Dicyclomine. Pt states her symptoms are well controlled with this medication; uses as needed.    Anxiety: Patient is currently on Zoloft for anxiety and doing well. Patient states that symptoms are well controlled.    AR: on Singulair for allergies symptoms; uses as needed with good relief of symlptoms    Mammo: 8-22-22    Medical History: has a past medical history of Anemia, Anxiety (02/28/2020), Chronic allergic rhinitis, GERD (gastroesophageal reflux disease) (02/28/2020), Hyperlipidemia (02/28/2020), IBS (irritable bowel syndrome) (02/28/2020), Microscopic hematuria (03/18/2016), Pain, knee, and Screening for colon cancer (03/11/2016).   Surgical History: has a past surgical history that includes Colonoscopy (2013,2016,2020); Nasal septum surgery; Esophagogastroduodenoscopy (2016, 2019); and Tonsillectomy.   Family History: family history includes Colon cancer in an other family member; Diabetes in her maternal uncle; Hypertension in her father and mother; Stroke in her maternal grandmother.   Social History: reports that she has never smoked. She has never used smokeless tobacco. She reports that she does not drink alcohol and does not use drugs.    Allergies: Hydrocodone-acetaminophen    Health Maintenance Due   Topic Date Due   • ANNUAL PHYSICAL  Never done       Immunization History   Administered Date(s) Administered   • COVID-19 (PFIZER) PURPLE CAP  "10/15/2021, 11/05/2021   • Flu Vaccine Intradermal Quad 18-64YR 10/02/2021   • Flu Vaccine Quad PF >36MO 10/20/2020, 10/25/2021   • Fluzone Quad >6mos (Multi-dose) 12/11/2020   • Influenza, Unspecified 10/20/2020, 12/11/2020, 09/15/2022   • Tdap 11/06/2013       Objective     Vitals:    12/19/22 0915   BP: 124/76   Pulse: 65   Resp: 18   SpO2: 99%   Weight: 73.5 kg (162 lb)   Height: 160 cm (63\")     Body mass index is 28.7 kg/m².     Wt Readings from Last 3 Encounters:   12/19/22 73.5 kg (162 lb)   07/06/22 73 kg (161 lb)   02/28/22 72.6 kg (160 lb)     BP Readings from Last 3 Encounters:   12/19/22 124/76   07/06/22 124/70   02/28/22 127/81           Patient Care Team:  Yi Maharaj PA as PCP - General (Family Medicine)    Physical Exam  Vitals and nursing note reviewed.   Constitutional:       Appearance: Normal appearance.   HENT:      Head: Normocephalic and atraumatic.      Right Ear: Tympanic membrane and ear canal normal.      Left Ear: Tympanic membrane and ear canal normal.   Neck:      Vascular: No carotid bruit.      Comments: Thyroid : gland size normal, nontender, no nodules or masses present on palpation   Cardiovascular:      Rate and Rhythm: Normal rate and regular rhythm.      Pulses: Normal pulses.      Heart sounds: Normal heart sounds.   Pulmonary:      Effort: Pulmonary effort is normal.      Breath sounds: Normal breath sounds.   Musculoskeletal:      Cervical back: Neck supple. No tenderness.   Lymphadenopathy:      Cervical: No cervical adenopathy.   Neurological:      Mental Status: She is alert.   Psychiatric:         Mood and Affect: Mood normal.         Behavior: Behavior normal.           Result Review :                           Assessment and Plan      Diagnoses and all orders for this visit:    1. Hyperlipidemia, unspecified hyperlipidemia type (Primary)  Comments:  Stable on Crestor 5mg daily; check labs in 2/23 and follow up in 6mths.  Orders:  -     Comprehensive " Metabolic Panel; Future  -     Lipid Panel; Future    2. Anxiety  Comments:  Stable on Zoloft 50mg daily;  continue current medications; follow up in 6mths.  Orders:  -     sertraline (ZOLOFT) 50 MG tablet; Take 1 tablet by mouth Daily.  Dispense: 90 tablet; Refill: 1  -     CBC & Differential; Future  -     TSH; Future    3. Allergic rhinitis, unspecified seasonality, unspecified trigger  Comments:  Stable: continue Singulair 10mg daily as needed; follow up in 6mths.  Orders:  -     montelukast (Singulair) 10 MG tablet; Take 1 tablet by mouth Every Night.  Dispense: 90 tablet; Refill: 1            Follow Up     Return in about 6 months (around 6/19/2023).    Patient was given instructions and counseling regarding her condition or for health maintenance advice. Please see specific information pulled into the AVS if appropriate.

## 2023-01-03 ENCOUNTER — TELEPHONE (OUTPATIENT)
Dept: OBSTETRICS AND GYNECOLOGY | Facility: CLINIC | Age: 43
End: 2023-01-03
Payer: COMMERCIAL

## 2023-01-03 DIAGNOSIS — Z30.41 ENCOUNTER FOR SURVEILLANCE OF CONTRACEPTIVE PILLS: ICD-10-CM

## 2023-01-03 RX ORDER — NORETHINDRONE ACETATE AND ETHINYL ESTRADIOL AND FERROUS FUMARATE 1MG-20(24)
1 KIT ORAL DAILY
Qty: 28 TABLET | Refills: 0 | Status: SHIPPED | OUTPATIENT
Start: 2023-01-03 | End: 2023-02-01 | Stop reason: SDUPTHER

## 2023-02-01 ENCOUNTER — TELEPHONE (OUTPATIENT)
Dept: OBSTETRICS AND GYNECOLOGY | Facility: CLINIC | Age: 43
End: 2023-02-01
Payer: COMMERCIAL

## 2023-02-01 DIAGNOSIS — Z30.41 ENCOUNTER FOR SURVEILLANCE OF CONTRACEPTIVE PILLS: ICD-10-CM

## 2023-02-01 RX ORDER — NORETHINDRONE ACETATE AND ETHINYL ESTRADIOL AND FERROUS FUMARATE 1MG-20(24)
1 KIT ORAL DAILY
Qty: 28 TABLET | Refills: 0 | Status: SHIPPED | OUTPATIENT
Start: 2023-02-01

## 2023-03-14 ENCOUNTER — LAB (OUTPATIENT)
Dept: LAB | Facility: HOSPITAL | Age: 43
End: 2023-03-14
Payer: COMMERCIAL

## 2023-03-14 DIAGNOSIS — E78.5 HYPERLIPIDEMIA, UNSPECIFIED HYPERLIPIDEMIA TYPE: Chronic | ICD-10-CM

## 2023-03-14 DIAGNOSIS — F41.9 ANXIETY: ICD-10-CM

## 2023-03-14 LAB
ALBUMIN SERPL-MCNC: 4.2 G/DL (ref 3.5–5.2)
ALBUMIN/GLOB SERPL: 1.4 G/DL
ALP SERPL-CCNC: 57 U/L (ref 39–117)
ALT SERPL W P-5'-P-CCNC: 18 U/L (ref 1–33)
ANION GAP SERPL CALCULATED.3IONS-SCNC: 10.1 MMOL/L (ref 5–15)
AST SERPL-CCNC: 14 U/L (ref 1–32)
BASOPHILS # BLD AUTO: 0.06 10*3/MM3 (ref 0–0.2)
BASOPHILS NFR BLD AUTO: 0.8 % (ref 0–1.5)
BILIRUB SERPL-MCNC: 0.2 MG/DL (ref 0–1.2)
BUN SERPL-MCNC: 9 MG/DL (ref 6–20)
BUN/CREAT SERPL: 9 (ref 7–25)
CALCIUM SPEC-SCNC: 9.8 MG/DL (ref 8.6–10.5)
CHLORIDE SERPL-SCNC: 105 MMOL/L (ref 98–107)
CHOLEST SERPL-MCNC: 175 MG/DL (ref 0–200)
CO2 SERPL-SCNC: 24.9 MMOL/L (ref 22–29)
CREAT SERPL-MCNC: 1 MG/DL (ref 0.57–1)
DEPRECATED RDW RBC AUTO: 38.5 FL (ref 37–54)
EGFRCR SERPLBLD CKD-EPI 2021: 73.2 ML/MIN/1.73
EOSINOPHIL # BLD AUTO: 0.14 10*3/MM3 (ref 0–0.4)
EOSINOPHIL NFR BLD AUTO: 1.8 % (ref 0.3–6.2)
ERYTHROCYTE [DISTWIDTH] IN BLOOD BY AUTOMATED COUNT: 13 % (ref 12.3–15.4)
GLOBULIN UR ELPH-MCNC: 2.9 GM/DL
GLUCOSE SERPL-MCNC: 92 MG/DL (ref 65–99)
HCT VFR BLD AUTO: 40.3 % (ref 34–46.6)
HDLC SERPL-MCNC: 68 MG/DL (ref 40–60)
HGB BLD-MCNC: 13.6 G/DL (ref 12–15.9)
IMM GRANULOCYTES # BLD AUTO: 0.02 10*3/MM3 (ref 0–0.05)
IMM GRANULOCYTES NFR BLD AUTO: 0.3 % (ref 0–0.5)
LDLC SERPL CALC-MCNC: 94 MG/DL (ref 0–100)
LDLC/HDLC SERPL: 1.37 {RATIO}
LYMPHOCYTES # BLD AUTO: 2.22 10*3/MM3 (ref 0.7–3.1)
LYMPHOCYTES NFR BLD AUTO: 28.4 % (ref 19.6–45.3)
MCH RBC QN AUTO: 27.9 PG (ref 26.6–33)
MCHC RBC AUTO-ENTMCNC: 33.7 G/DL (ref 31.5–35.7)
MCV RBC AUTO: 82.8 FL (ref 79–97)
MONOCYTES # BLD AUTO: 0.58 10*3/MM3 (ref 0.1–0.9)
MONOCYTES NFR BLD AUTO: 7.4 % (ref 5–12)
NEUTROPHILS NFR BLD AUTO: 4.81 10*3/MM3 (ref 1.7–7)
NEUTROPHILS NFR BLD AUTO: 61.3 % (ref 42.7–76)
NRBC BLD AUTO-RTO: 0 /100 WBC (ref 0–0.2)
PLATELET # BLD AUTO: 222 10*3/MM3 (ref 140–450)
PMV BLD AUTO: 11.4 FL (ref 6–12)
POTASSIUM SERPL-SCNC: 4 MMOL/L (ref 3.5–5.2)
PROT SERPL-MCNC: 7.1 G/DL (ref 6–8.5)
RBC # BLD AUTO: 4.87 10*6/MM3 (ref 3.77–5.28)
SODIUM SERPL-SCNC: 140 MMOL/L (ref 136–145)
TRIGL SERPL-MCNC: 70 MG/DL (ref 0–150)
TSH SERPL DL<=0.05 MIU/L-ACNC: 1.8 UIU/ML (ref 0.27–4.2)
VLDLC SERPL-MCNC: 13 MG/DL (ref 5–40)
WBC NRBC COR # BLD: 7.83 10*3/MM3 (ref 3.4–10.8)

## 2023-03-14 PROCEDURE — 80061 LIPID PANEL: CPT

## 2023-03-14 PROCEDURE — 80050 GENERAL HEALTH PANEL: CPT

## 2023-03-14 PROCEDURE — 36415 COLL VENOUS BLD VENIPUNCTURE: CPT

## 2023-05-01 ENCOUNTER — TELEPHONE (OUTPATIENT)
Dept: OBSTETRICS AND GYNECOLOGY | Facility: CLINIC | Age: 43
End: 2023-05-01
Payer: COMMERCIAL

## 2023-05-01 DIAGNOSIS — Z30.41 ENCOUNTER FOR SURVEILLANCE OF CONTRACEPTIVE PILLS: ICD-10-CM

## 2023-05-01 RX ORDER — NORETHINDRONE ACETATE AND ETHINYL ESTRADIOL AND FERROUS FUMARATE 1MG-20(24)
1 KIT ORAL DAILY
Qty: 28 TABLET | Refills: 0 | Status: SHIPPED | OUTPATIENT
Start: 2023-05-01

## 2023-05-26 ENCOUNTER — PATIENT MESSAGE (OUTPATIENT)
Dept: FAMILY MEDICINE CLINIC | Facility: CLINIC | Age: 43
End: 2023-05-26
Payer: COMMERCIAL

## 2023-05-26 ENCOUNTER — TELEPHONE (OUTPATIENT)
Dept: OBSTETRICS AND GYNECOLOGY | Facility: CLINIC | Age: 43
End: 2023-05-26
Payer: COMMERCIAL

## 2023-05-26 DIAGNOSIS — Z30.41 ENCOUNTER FOR SURVEILLANCE OF CONTRACEPTIVE PILLS: ICD-10-CM

## 2023-05-26 DIAGNOSIS — K58.9 IRRITABLE BOWEL SYNDROME, UNSPECIFIED TYPE: ICD-10-CM

## 2023-05-26 RX ORDER — DICYCLOMINE HCL 20 MG
20 TABLET ORAL EVERY 6 HOURS PRN
Qty: 60 TABLET | Refills: 1 | Status: SHIPPED | OUTPATIENT
Start: 2023-05-26

## 2023-05-26 RX ORDER — NORETHINDRONE ACETATE AND ETHINYL ESTRADIOL AND FERROUS FUMARATE 1MG-20(24)
1 KIT ORAL DAILY
Qty: 28 TABLET | Refills: 0 | Status: SHIPPED | OUTPATIENT
Start: 2023-05-26

## 2023-05-26 NOTE — TELEPHONE ENCOUNTER
OK FOR HUB TO READ    Left message for patient to call the office back. Received a fax for a refill request on patients birth control. Her last appt was 02/28/22 with  for an annual exam. She does not have an upcoming appointment scheduled. I have refilled her birth control for one more month. But, she must schedule an appointment and keep it for any additional refills. Please offer to schedule with a nurse practitioner if  does not have anything soon.

## 2023-05-26 NOTE — TELEPHONE ENCOUNTER
From: Heidi Mills  To: Yi Maharaj  Sent: 5/26/2023 2:14 PM EDT  Subject: Dicyclomine    I believe you sent in a prescription for this before but I cannot remember The bottle I have does not have any more refills and I could use a refill. My stomach has been hurting more lately than normal and I realized I only have a few left.   Thanks!

## 2023-08-16 ENCOUNTER — TELEPHONE (OUTPATIENT)
Dept: OBSTETRICS AND GYNECOLOGY | Facility: CLINIC | Age: 43
End: 2023-08-16
Payer: COMMERCIAL

## 2023-08-16 DIAGNOSIS — Z30.41 ENCOUNTER FOR SURVEILLANCE OF CONTRACEPTIVE PILLS: ICD-10-CM

## 2023-08-16 RX ORDER — NORETHINDRONE ACETATE AND ETHINYL ESTRADIOL AND FERROUS FUMARATE 1MG-20(24)
1 KIT ORAL DAILY
Qty: 28 TABLET | Refills: 0 | Status: SHIPPED | OUTPATIENT
Start: 2023-08-16

## 2023-08-21 ENCOUNTER — TRANSCRIBE ORDERS (OUTPATIENT)
Dept: ADMINISTRATIVE | Facility: HOSPITAL | Age: 43
End: 2023-08-21
Payer: COMMERCIAL

## 2023-08-21 DIAGNOSIS — Z12.31 ENCOUNTER FOR SCREENING MAMMOGRAM FOR MALIGNANT NEOPLASM OF BREAST: Primary | ICD-10-CM

## 2023-08-29 NOTE — PROGRESS NOTES
"Well Woman Visit    CC: Scheduled annual well gyn visit  Chief Complaint   Patient presents with    Gynecologic Exam       Myriad intake in the past?: No    Previously Qualified? NO    Contraception:  Birth control pill    HPI:   43 y.o.     Menses:   q 28 days, lasts 3-4 days, changes products q 4-6 hrs on heaviest days.     Pain:  Mild, OTC meds control discomfort    PCP: does manage PMHx and preventative labs  History: PMHx, Meds, Allergies, PSHx, Social Hx, and POBHx all reviewed and updated.    Pt has no complaints today.    PHYSICAL EXAM:  /85   Pulse 74   Ht 160 cm (62.99\")   Wt 75.8 kg (167 lb)   BMI 29.59 kg/mý  Not found.  General- NAD, alert and oriented, appropriate  Psych- Normal mood, good memory  Neck- No masses, no thyroid enlargement  CV- Regular rhythm, no murnurs  Resp- CTA to bases, no wheezes  Abdomen- Soft, non distended, non tender, no masses    Breast left-  Bilaterally symmetrical, no masses, non tender, no nipple discharge  Breast right- Bilaterally symmetrical, no masses, non tender, no nipple discharge    External genitalia- Normal female, no lesions  Urethra/meatus- Normal, no masses, non tender  Bladder- Normal, no masses, non tender  Vagina- Normal, no atrophy, no lesions, no discharge.    Cvx- Normal, no lesions, no discharge, No cervical motion tenderness  Uterus- Normal size, shape & consistency.  Non tender, mobile, & no prolapse  Adnexa- No mass, non tender  Anus/Rectum/Perineum- Not performed    Lymphatic- No palpable neck, axillary, or groin nodes  Ext- No edema, no cyanosis    Skin- No lesions, no rashes, no acanthosis nigricans      ASSESSMENT and PLAN:    Diagnoses and all orders for this visit:    1. Well woman exam with routine gynecological exam (Primary)    2. Encounter for surveillance of contraceptive pills  -     norethindrone-ethinyl estradiol-ferrous fumarate (Aurovela 24 FE) 1-20 MG-MCG(24) per tablet; Take 1 tablet by mouth Daily.  Dispense: 84 " tablet; Refill: 3        Preventative:  BREAST HEALTH- Monthly self breast exam importance and how to reviewed. MMG and/or MRI (prn) reviewed per society guidelines and her individual history. Screen: Pt will call to schedule  CERVICAL CANCER Screening- Reviewed current ASCCP guidelines for screening w and wo cotest HPV, age specific.  Screen: Already up to date  COLON CANCER Screening- Reviewed current medical society guidelines and options.  Screen:  Not medically needed  Follow up PCP/Specialist PMHx and Labs      She understands the importance of having any ordered tests to be performed in a timely fashion.  The risks of not performing them include, but are not limited to, advanced cancer stages, bone loss from osteoporosis and/or subsequent increase in morbidity and/or mortality.  She is encouraged to review her results online and/or contact or office if she has questions.     Follow Up:  Return in about 1 year (around 8/31/2024) for Annual physical.            Pinky Webb MD  08/31/2023    [facility]  [facility] [place] [address]  Dept: [phone]  Dept Fax: [phone]  Loc: [phone]  Loc Fax: [phone]

## 2023-08-31 ENCOUNTER — OFFICE VISIT (OUTPATIENT)
Dept: OBSTETRICS AND GYNECOLOGY | Facility: CLINIC | Age: 43
End: 2023-08-31
Payer: COMMERCIAL

## 2023-08-31 VITALS
HEIGHT: 63 IN | DIASTOLIC BLOOD PRESSURE: 85 MMHG | HEART RATE: 74 BPM | WEIGHT: 167 LBS | SYSTOLIC BLOOD PRESSURE: 127 MMHG | BODY MASS INDEX: 29.59 KG/M2

## 2023-08-31 DIAGNOSIS — Z01.419 WELL WOMAN EXAM WITH ROUTINE GYNECOLOGICAL EXAM: Primary | ICD-10-CM

## 2023-08-31 DIAGNOSIS — Z30.41 ENCOUNTER FOR SURVEILLANCE OF CONTRACEPTIVE PILLS: ICD-10-CM

## 2023-08-31 RX ORDER — NORETHINDRONE ACETATE AND ETHINYL ESTRADIOL AND FERROUS FUMARATE 1MG-20(24)
1 KIT ORAL DAILY
Qty: 84 TABLET | Refills: 3 | Status: SHIPPED | OUTPATIENT
Start: 2023-08-31

## 2023-11-17 DIAGNOSIS — E78.5 HYPERLIPIDEMIA, UNSPECIFIED HYPERLIPIDEMIA TYPE: Chronic | ICD-10-CM

## 2023-11-17 RX ORDER — ROSUVASTATIN CALCIUM 5 MG/1
5 TABLET, COATED ORAL DAILY
Qty: 30 TABLET | Refills: 1 | Status: SHIPPED | OUTPATIENT
Start: 2023-11-17

## 2023-11-21 DIAGNOSIS — J30.9 ALLERGIC RHINITIS, UNSPECIFIED SEASONALITY, UNSPECIFIED TRIGGER: Chronic | ICD-10-CM

## 2023-11-21 DIAGNOSIS — F41.9 ANXIETY: Chronic | ICD-10-CM

## 2023-11-21 RX ORDER — MONTELUKAST SODIUM 10 MG/1
10 TABLET ORAL NIGHTLY
Qty: 90 TABLET | Refills: 1 | OUTPATIENT
Start: 2023-11-21

## 2023-12-04 ENCOUNTER — HOSPITAL ENCOUNTER (OUTPATIENT)
Dept: MAMMOGRAPHY | Facility: HOSPITAL | Age: 43
Discharge: HOME | End: 2023-12-04
Admitting: OBSTETRICS & GYNECOLOGY
Payer: COMMERCIAL

## 2023-12-04 DIAGNOSIS — Z12.31 ENCOUNTER FOR SCREENING MAMMOGRAM FOR MALIGNANT NEOPLASM OF BREAST: ICD-10-CM

## 2023-12-04 PROCEDURE — 77063 BREAST TOMOSYNTHESIS BI: CPT

## 2023-12-04 PROCEDURE — 77067 SCR MAMMO BI INCL CAD: CPT

## 2023-12-18 DIAGNOSIS — E78.5 HYPERLIPIDEMIA, UNSPECIFIED HYPERLIPIDEMIA TYPE: Chronic | ICD-10-CM

## 2023-12-18 DIAGNOSIS — F41.9 ANXIETY: Chronic | ICD-10-CM

## 2023-12-18 DIAGNOSIS — J30.9 ALLERGIC RHINITIS, UNSPECIFIED SEASONALITY, UNSPECIFIED TRIGGER: Chronic | ICD-10-CM

## 2023-12-18 RX ORDER — ROSUVASTATIN CALCIUM 5 MG/1
5 TABLET, COATED ORAL DAILY
Qty: 30 TABLET | Refills: 1 | OUTPATIENT
Start: 2023-12-18

## 2023-12-18 RX ORDER — MONTELUKAST SODIUM 10 MG/1
10 TABLET ORAL NIGHTLY
Qty: 90 TABLET | Refills: 1 | OUTPATIENT
Start: 2023-12-18

## 2023-12-22 ENCOUNTER — OFFICE VISIT (OUTPATIENT)
Dept: FAMILY MEDICINE CLINIC | Facility: CLINIC | Age: 43
End: 2023-12-22
Payer: COMMERCIAL

## 2023-12-22 VITALS
SYSTOLIC BLOOD PRESSURE: 131 MMHG | BODY MASS INDEX: 29.77 KG/M2 | OXYGEN SATURATION: 98 % | DIASTOLIC BLOOD PRESSURE: 84 MMHG | HEART RATE: 64 BPM | WEIGHT: 168 LBS | HEIGHT: 63 IN

## 2023-12-22 DIAGNOSIS — K58.9 IRRITABLE BOWEL SYNDROME, UNSPECIFIED TYPE: Chronic | ICD-10-CM

## 2023-12-22 DIAGNOSIS — F41.9 ANXIETY: Chronic | ICD-10-CM

## 2023-12-22 DIAGNOSIS — M77.11 LATERAL EPICONDYLITIS OF RIGHT ELBOW: ICD-10-CM

## 2023-12-22 DIAGNOSIS — E78.5 HYPERLIPIDEMIA, UNSPECIFIED HYPERLIPIDEMIA TYPE: Primary | Chronic | ICD-10-CM

## 2023-12-22 DIAGNOSIS — Z23 NEED FOR INFLUENZA VACCINATION: ICD-10-CM

## 2023-12-22 DIAGNOSIS — J30.9 ALLERGIC RHINITIS, UNSPECIFIED SEASONALITY, UNSPECIFIED TRIGGER: Chronic | ICD-10-CM

## 2023-12-22 RX ORDER — MONTELUKAST SODIUM 10 MG/1
10 TABLET ORAL NIGHTLY
Qty: 90 TABLET | Refills: 1 | Status: SHIPPED | OUTPATIENT
Start: 2023-12-22

## 2023-12-22 RX ORDER — ROSUVASTATIN CALCIUM 5 MG/1
5 TABLET, COATED ORAL DAILY
Qty: 30 TABLET | Refills: 1 | Status: SHIPPED | OUTPATIENT
Start: 2023-12-22 | End: 2023-12-22 | Stop reason: SDUPTHER

## 2023-12-22 RX ORDER — ROSUVASTATIN CALCIUM 5 MG/1
5 TABLET, COATED ORAL DAILY
Qty: 90 TABLET | Refills: 1 | Status: SHIPPED | OUTPATIENT
Start: 2023-12-22

## 2023-12-22 RX ORDER — DICYCLOMINE HCL 20 MG
20 TABLET ORAL EVERY 6 HOURS PRN
Qty: 60 TABLET | Refills: 1 | Status: SHIPPED | OUTPATIENT
Start: 2023-12-22

## 2023-12-22 NOTE — PROGRESS NOTES
Chief Complaint  Chief Complaint   Patient presents with    Follow-up     Pt is c/o of R elbow pain since August but its been more frequent lately. Pt doing well with anxiety medication as well.     Hyperlipidemia    Anxiety       Subjective          Heidi Mills presents to [facility]  History of Present Illness    IBS: Pt is currently taking Bentyl as needed for the treatment of IBS.      Allergic Rhinitis: Patient presents for management of Allergic Rhinitis. Patient is currently on Singulair. Symptoms are well controlled.     Anxiety: Patient is currently on Sertraline for anxiety and doing well. Patient states that symptoms are well controlled.     HL: Patient presents for management of hyperlipidemia. Patient is currently on Rosuvastatin. Patient denies muscle cramps and muscle weakness. Patient is monitoring diet to help lower lipids.    Patient also c/o right elbow discomfort since August, or since Thanksgiving.  Now symptoms are constant.  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     Mammo: 12/4/23 (ordered and maintained by Dr. Webb)    Medical History: has a past medical history of Anemia, Anxiety (02/28/2020), Chronic allergic rhinitis, GERD (gastroesophageal reflux disease) (02/28/2020), Hyperlipidemia (02/28/2020), IBS (irritable bowel syndrome) (02/28/2020), Microscopic hematuria (03/18/2016), Pain, knee, and Screening for colon cancer (03/11/2016).   Surgical History: has a past surgical history that includes Colonoscopy (2013,2016,2020); Nasal septum surgery; Esophagogastroduodenoscopy (2016, 2019); and Tonsillectomy.   Family History: family history includes Colon cancer in an other family member; Diabetes in her maternal uncle; Hypertension in her father and mother; Stroke in her maternal grandmother.   Social History: reports that she has never smoked. She has never  "used smokeless tobacco. She reports that she does not drink alcohol and does not use drugs.    Allergies: Hydrocodone-acetaminophen    Health Maintenance Due   Topic Date Due    PAP SMEAR  02/28/2023    COVID-19 Vaccine (3 - 2023-24 season) 09/01/2023    TDAP/TD VACCINES (2 - Td or Tdap) 11/06/2023       Immunization History   Administered Date(s) Administered    COVID-19 (PFIZER) Purple Cap Monovalent 10/15/2021, 11/05/2021    Flu Vaccine Intradermal Quad 18-64YR 10/02/2021    Flu Vaccine Quad PF >36MO 10/20/2020, 10/25/2021    Fluzone (or Fluarix & Flulaval for VFC) >6mos 10/20/2020, 10/25/2021, 09/15/2022, 12/22/2023    Fluzone Quad >6mos (Multi-dose) 12/11/2020    Influenza, Unspecified 10/20/2020, 12/11/2020, 09/15/2022    Tdap 11/06/2013       Objective     Vitals:    12/22/23 1126   BP: 131/84   Pulse: 64   SpO2: 98%   Weight: 76.2 kg (168 lb)   Height: 160 cm (62.99\")     Body mass index is 29.77 kg/m².     Wt Readings from Last 3 Encounters:   12/22/23 76.2 kg (168 lb)   08/31/23 75.8 kg (167 lb)   07/06/23 76.2 kg (168 lb)     BP Readings from Last 3 Encounters:   12/22/23 131/84   08/31/23 127/85   07/06/23 126/78       BMI is >= 25 and <30. (Overweight) The following options were offered after discussion;: weight loss educational material (shared in after visit summary)      Patient Care Team:  Yi Maharaj PA as PCP - General (Family Medicine)    Physical Exam  Vitals and nursing note reviewed.   Constitutional:       Appearance: Normal appearance.   HENT:      Head: Normocephalic and atraumatic.   Neck:      Vascular: No carotid bruit.      Comments: Thyroid : gland size normal, nontender, no nodules or masses present on palpation   Cardiovascular:      Rate and Rhythm: Normal rate and regular rhythm.      Pulses: Normal pulses.      Heart sounds: Normal heart sounds.   Pulmonary:      Effort: Pulmonary effort is normal.      Breath sounds: Normal breath sounds.   Musculoskeletal:      Right " elbow: Tenderness present in lateral epicondyle.      Cervical back: Neck supple. No tenderness.      Right lower leg: No edema.      Left lower leg: No edema.   Lymphadenopathy:      Cervical: No cervical adenopathy.   Neurological:      Mental Status: She is alert.   Psychiatric:         Mood and Affect: Mood normal.         Behavior: Behavior normal.           Result Review :                           Assessment and Plan      Diagnoses and all orders for this visit:    1. Hyperlipidemia, unspecified hyperlipidemia type (Primary)  Comments:  Stable on Crestor 5mg daily; check labs; follow up in 6mths.  Orders:  -     rosuvastatin (CRESTOR) 5 MG tablet; Take 1 tablet by mouth Daily.  Dispense: 90 tablet; Refill: 1  -     Comprehensive Metabolic Panel; Future  -     Lipid Panel; Future    2. Irritable bowel syndrome, unspecified type  Comments:  Stable: continue with Bentyl as needed.  Orders:  -     dicyclomine (BENTYL) 20 MG tablet; Take 1 tablet by mouth Every 6 (Six) Hours As Needed (abdominal spasm).  Dispense: 60 tablet; Refill: 1    3. Need for influenza vaccination  -     Fluzone >6 Months (1786-0540)    4. Lateral epicondylitis of right elbow  Comments:  New acute problem: Trial of diclofenac gel up to 4 times daily to affected area, and counterforce armband and decrease repetitive motion.  Orders:  -     Diclofenac Sodium (VOLTAREN) 1 % gel gel; Apply 4 g topically to the appropriate area as directed 4 (Four) Times a Day As Needed (elbow pain).  Dispense: 350 g; Refill: 1    5. Allergic rhinitis, unspecified seasonality, unspecified trigger  Comments:  Stable: continue Singulair 10mg daily as needed; follow up in 6mths.  Orders:  -     montelukast (Singulair) 10 MG tablet; Take 1 tablet by mouth Every Night.  Dispense: 90 tablet; Refill: 1    6. Anxiety  Comments:  Stable on Zoloft 50mg daily;  continue current medications; follow up in 6mths.  Orders:  -     sertraline (ZOLOFT) 50 MG tablet; Take 1 tablet  by mouth Daily.  Dispense: 90 tablet; Refill: 1    Other orders  -     Discontinue: rosuvastatin (CRESTOR) 5 MG tablet; Take 1 tablet by mouth Daily.  Dispense: 30 tablet; Refill: 1            Follow Up     Return in about 6 months (around 6/22/2024).    Patient was given instructions and counseling regarding her condition or for health maintenance advice. Please see specific information pulled into the AVS if appropriate.

## 2024-01-12 ENCOUNTER — TELEPHONE (OUTPATIENT)
Dept: OBSTETRICS AND GYNECOLOGY | Facility: CLINIC | Age: 44
End: 2024-01-12
Payer: COMMERCIAL

## 2024-01-12 NOTE — TELEPHONE ENCOUNTER
I received 5 faxed refill request from [facility] on the pt's [place] 24 FE medication.  An Rx was sent to that pharmacy on 08/31/23 with #84 and 3 additional refills, which will last the patient until 08/31/2024.  I have called and denied the refill request due to an Rx already being on file.  I have left a voicemail message for the pt regarding this information

## 2024-02-15 DIAGNOSIS — F41.9 ANXIETY: Chronic | ICD-10-CM

## 2024-02-22 ENCOUNTER — TELEMEDICINE (OUTPATIENT)
Dept: FAMILY MEDICINE CLINIC | Facility: TELEHEALTH | Age: 44
End: 2024-02-22
Payer: COMMERCIAL

## 2024-02-22 DIAGNOSIS — H66.002 ACUTE SUPPURATIVE OTITIS MEDIA OF LEFT EAR WITHOUT SPONTANEOUS RUPTURE OF TYMPANIC MEMBRANE, RECURRENCE NOT SPECIFIED: Primary | ICD-10-CM

## 2024-02-22 RX ORDER — AMOXICILLIN AND CLAVULANATE POTASSIUM 875; 125 MG/1; MG/1
1 TABLET, FILM COATED ORAL 2 TIMES DAILY
Qty: 20 TABLET | Refills: 0 | Status: SHIPPED | OUTPATIENT
Start: 2024-02-22 | End: 2024-03-03

## 2024-02-22 RX ORDER — PREDNISONE 10 MG/1
TABLET ORAL
Qty: 21 TABLET | Refills: 0 | Status: SHIPPED | OUTPATIENT
Start: 2024-02-22

## 2024-02-22 NOTE — PROGRESS NOTES
You have chosen to receive care through a telehealth visit.  Do you consent to use a video/audio connection for your medical care today? Yes     CHIEF COMPLAINT  No chief complaint on file.        HPI  Heidi Mills is a 43 y.o. female  presents with complaint of fever of 99.5, left ear fullness/pain/popping, left side of throat is raw, swollen lymph node on left side of neck, PND.  Denies congestion, cough, wheezing, shortness of breath.  Has recent history of recurrent ear infections.     Review of Systems  See HPI    Past Medical History:   Diagnosis Date    Anemia     Anxiety 02/28/2020    Chronic allergic rhinitis     GERD (gastroesophageal reflux disease) 02/28/2020    Hyperlipidemia 02/28/2020    IBS (irritable bowel syndrome) 02/28/2020    Microscopic hematuria 03/18/2016    Pain, knee     Screening for colon cancer 03/11/2016       Family History   Problem Relation Age of Onset    Hypertension Father     Hypertension Mother     Stroke Maternal Grandmother     Diabetes Maternal Uncle     Colon cancer Other         family history    Breast cancer Neg Hx     Ovarian cancer Neg Hx     Uterine cancer Neg Hx     Prostate cancer Neg Hx     Deep vein thrombosis Neg Hx        Social History     Socioeconomic History    Marital status:    Tobacco Use    Smoking status: Never    Smokeless tobacco: Never   Vaping Use    Vaping Use: Never used   Substance and Sexual Activity    Alcohol use: Never    Drug use: Never    Sexual activity: Yes     Partners: Male     Birth control/protection: Birth control pill       Heidi Mills  reports that she has never smoked. She has never used smokeless tobacco.              There were no vitals taken for this visit.    PHYSICAL EXAM  Physical Exam   Constitutional: She is oriented to person, place, and time. She appears well-developed and well-nourished. She does not have a sickly appearance. She does not appear ill.   HENT:   Head: Normocephalic and atraumatic.    Pulmonary/Chest: Effort normal.  No respiratory distress.  Lymphadenopathy:        Left cervical: Anterior cervical adenopathy present.   Neurological: She is alert and oriented to person, place, and time.           Diagnoses and all orders for this visit:    1. Acute suppurative otitis media of left ear without spontaneous rupture of tympanic membrane, recurrence not specified (Primary)  -     amoxicillin-clavulanate (AUGMENTIN) 875-125 MG per tablet; Take 1 tablet by mouth 2 (Two) Times a Day for 10 days.  Dispense: 20 tablet; Refill: 0  -     predniSONE (DELTASONE) 10 MG (21) dose pack; Use as directed on package  Dispense: 21 tablet; Refill: 0    --take medications as prescribed  --increase fluids, rest as needed, tylenol or ibuprofen for pain  --f/u in 5-7 days if no improvement        FOLLOW-UP  As discussed during visit with PCP/[facility] if no improvement or Urgent Care/Emergency Department if worsening of symptoms    Patient verbalizes understanding of medication dosage, comfort measures, instructions for treatment and follow-up.    Heather Hummel, APRN  02/22/2024  16:49 EST    The use of a video visit has been reviewed with the patient and verbal informed consent has been obtained. Myself and Heidi Mills participated in this visit. The patient is located in [address] [place] [address].    I am located in [place], [place]. [employer] and [employer] were utilized. I spent 8 minutes in the patient's chart for this visit.      Note Disclaimer: At [facility], we believe that sharing information builds trust and better   relationships. You are receiving this note because you recently visited [facility]. It is possible you   will see health information before a provider has talked with you about it. This kind of information can   be easy to misunderstand. To help you fully understand what it means for your health, we urge you to   discuss this note with your provider.

## 2024-02-22 NOTE — PATIENT INSTRUCTIONS
Otitis Media, Adult    Otitis media occurs when there is inflammation and fluid in the middle ear with signs and symptoms of an acute infection. The middle ear is a part of the ear that contains bones for hearing as well as air that helps send sounds to the brain. When infected fluid builds up in this space, it causes pressure and can lead to an ear infection. The eustachian tube connects the middle ear to the back of the nose (nasopharynx) and normally allows air into the middle ear. If the eustachian tube becomes blocked, fluid can build up and become infected.  What are the causes?  This condition is caused by a blockage in the eustachian tube. This can be caused by mucus or by swelling of the tube. Problems that can cause a blockage include:  A cold or other upper respiratory infection.  Allergies.  An irritant, such as tobacco smoke.  Enlarged adenoids. The adenoids are areas of soft tissue located high in the back of the throat, behind the nose and the roof of the mouth. They are part of the body's defense system (immune system).  A mass in the nasopharynx.  Damage to the ear caused by pressure changes (barotrauma).  What increases the risk?  You are more likely to develop this condition if you:  Smoke or are exposed to tobacco smoke.  Have an opening in the roof of your mouth (cleft palate).  Have gastroesophageal reflux.  Have an immune system disorder.  What are the signs or symptoms?  Symptoms of this condition include:  Ear pain.  Fever.  Decreased hearing.  Tiredness (lethargy).  Fluid leaking from the ear, if the eardrum is ruptured or has burst.  Ringing in the ear.  How is this diagnosed?    This condition is diagnosed with a physical exam. During the exam, your health care provider will use an instrument called an otoscope to look in your ear and check for redness, swelling, and fluid. He or she will also ask about your symptoms.  Your health care provider may also order tests, such as:  A pneumatic  otoscopy. This is a test to check the movement of the eardrum. It is done by squeezing a small amount of air into the ear.  A tympanogram. This is a test that shows how well the eardrum moves in response to air pressure in the ear canal. It provides a graph for your health care provider to review.  How is this treated?  This condition can go away on its own within 3-5 days. But if the condition is caused by a bacterial infection and does not go away on its own, or if it keeps coming back, your health care provider may:  Prescribe antibiotic medicine to treat the infection.  Prescribe or recommend medicines to control pain.  Follow these instructions at home:  Take over-the-counter and prescription medicines only as told by your health care provider.  If you were prescribed an antibiotic medicine, take it as told by your health care provider. Do not stop taking the antibiotic even if you start to feel better.  Keep all follow-up visits. This is important.  Contact a health care provider if:  You have bleeding from your nose.  There is a lump on your neck.  You are not feeling better in 5 days.  You feel worse instead of better.  Get help right away if:  You have severe pain that is not controlled with medicine.  You have swelling, redness, or pain around your ear.  You have stiffness in your neck.  A part of your face is not moving (paralyzed).  The bone behind your ear (mastoid bone) is tender when you touch it.  You develop a severe headache.  Summary  Otitis media is redness, soreness, and swelling of the middle ear, usually resulting in pain and decreased hearing.  This condition can go away on its own within 3-5 days.  If the problem does not go away in 3-5 days, your health care provider may give you medicines to treat the infection.  If you were prescribed an antibiotic medicine, take it as told by your health care provider.  Follow all instructions that were given to you by your health care provider.  This  information is not intended to replace advice given to you by your health care provider. Make sure you discuss any questions you have with your health care provider.  Document Revised: 03/28/2022 Document Reviewed: 03/28/2022  Elsevier Patient Education © 2023 [employer] Inc[employer]

## 2024-03-06 ENCOUNTER — LAB (OUTPATIENT)
Dept: LAB | Facility: HOSPITAL | Age: 44
End: 2024-03-06
Payer: COMMERCIAL

## 2024-03-06 DIAGNOSIS — F41.9 ANXIETY: Chronic | ICD-10-CM

## 2024-03-06 DIAGNOSIS — E78.5 HYPERLIPIDEMIA, UNSPECIFIED HYPERLIPIDEMIA TYPE: Chronic | ICD-10-CM

## 2024-03-06 LAB
ALBUMIN SERPL-MCNC: 4 G/DL (ref 3.5–5.2)
ALBUMIN/GLOB SERPL: 1.5 G/DL
ALP SERPL-CCNC: 59 U/L (ref 39–117)
ALT SERPL W P-5'-P-CCNC: 16 U/L (ref 1–33)
ANION GAP SERPL CALCULATED.3IONS-SCNC: 12 MMOL/L (ref 5–15)
AST SERPL-CCNC: 13 U/L (ref 1–32)
BILIRUB SERPL-MCNC: <0.2 MG/DL (ref 0–1.2)
BUN SERPL-MCNC: 8 MG/DL (ref 6–20)
BUN/CREAT SERPL: 10 (ref 7–25)
CALCIUM SPEC-SCNC: 8.6 MG/DL (ref 8.6–10.5)
CHLORIDE SERPL-SCNC: 106 MMOL/L (ref 98–107)
CHOLEST SERPL-MCNC: 184 MG/DL (ref 0–200)
CO2 SERPL-SCNC: 23 MMOL/L (ref 22–29)
CREAT SERPL-MCNC: 0.8 MG/DL (ref 0.57–1)
EGFRCR SERPLBLD CKD-EPI 2021: 95.1 ML/MIN/1.73
GLOBULIN UR ELPH-MCNC: 2.7 GM/DL
GLUCOSE SERPL-MCNC: 86 MG/DL (ref 65–99)
HDLC SERPL-MCNC: 57 MG/DL (ref 40–60)
LDLC SERPL CALC-MCNC: 109 MG/DL (ref 0–100)
LDLC/HDLC SERPL: 1.87 {RATIO}
POTASSIUM SERPL-SCNC: 4.3 MMOL/L (ref 3.5–5.2)
PROT SERPL-MCNC: 6.7 G/DL (ref 6–8.5)
SODIUM SERPL-SCNC: 141 MMOL/L (ref 136–145)
TRIGL SERPL-MCNC: 102 MG/DL (ref 0–150)
VLDLC SERPL-MCNC: 18 MG/DL (ref 5–40)

## 2024-03-06 PROCEDURE — 80061 LIPID PANEL: CPT

## 2024-03-06 PROCEDURE — 80053 COMPREHEN METABOLIC PANEL: CPT

## 2024-03-06 PROCEDURE — 87081 CULTURE SCREEN ONLY: CPT | Performed by: NURSE PRACTITIONER

## 2024-03-06 PROCEDURE — 36415 COLL VENOUS BLD VENIPUNCTURE: CPT

## 2024-05-21 DIAGNOSIS — E78.5 HYPERLIPIDEMIA, UNSPECIFIED HYPERLIPIDEMIA TYPE: Chronic | ICD-10-CM

## 2024-05-21 DIAGNOSIS — F41.9 ANXIETY: Chronic | ICD-10-CM

## 2024-05-21 DIAGNOSIS — J30.9 ALLERGIC RHINITIS, UNSPECIFIED SEASONALITY, UNSPECIFIED TRIGGER: Chronic | ICD-10-CM

## 2024-05-22 RX ORDER — MONTELUKAST SODIUM 10 MG/1
10 TABLET ORAL NIGHTLY
Qty: 90 TABLET | Refills: 1 | OUTPATIENT
Start: 2024-05-22

## 2024-05-22 RX ORDER — ROSUVASTATIN CALCIUM 5 MG/1
5 TABLET, COATED ORAL DAILY
Qty: 90 TABLET | Refills: 1 | OUTPATIENT
Start: 2024-05-22

## 2024-05-22 NOTE — TELEPHONE ENCOUNTER
Name: Heidi Mills    Relationship: Self    Best Callback Number: [phone]    HUB PROVIDED THE RELAY MESSAGE FROM THE OFFICE   PATIENT VOICED UNDERSTANDING AND HAS NO FURTHER QUESTIONS AT THIS TIME    ADDITIONAL INFORMATION: PATIENT DOES HAVE ENOUGH MEDICATION TO LAST UNTIL HER APPOINTMENT   
OK FOR HUB TO RELAY:    Tried calling patient, Rahul,Needing to make sure she has enough medication until her appt on 06/21/2024  
Please check with patient to see if she has enough to last until her appt on 6/21/24. If she does, please decline refills.  
steady

## 2024-06-19 NOTE — PROGRESS NOTES
Chief Complaint  Chief Complaint   Patient presents with    Follow-up     6 month     Hyperlipidemia    Anxiety    Allergic Rhinitis    Irritable Bowel Syndrome       Subjective          Heidi Mills presents to [facility] for 6mth follow up.    History of Present Illness    IBS: Pt is currently taking Bentyl as needed for the treatment of IBS.      Allergic Rhinitis: Patient presents for management of Allergic Rhinitis. Patient is currently on Singulair. Symptoms are well controlled.     Anxiety: Patient is currently on Sertraline for anxiety and doing well. Patient states that symptoms are well controlled.     HL: Patient presents for management of hyperlipidemia. Patient is currently on Rosuvastatin. Patient denies muscle cramps and muscle weakness. Patient is monitoring diet to help lower lipids.    Urinary incontinence: patient states stress incontinence for years but sx are worsening; now when she tries to participate in activities with children she will have incontinence.  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  Patient states symptoms are interfering with daily life activities.     Mammo: 12/4/23 (ordered and maintained by Dr. Webb)    Medical History: has a past medical history of Anemia, Anxiety (02/28/2020), Chronic allergic rhinitis, GERD (gastroesophageal reflux disease) (02/28/2020), Hyperlipidemia (02/28/2020), IBS (irritable bowel syndrome) (02/28/2020), Microscopic hematuria (03/18/2016), Pain, knee, and Screening for colon cancer (03/11/2016).   Surgical History: has a past surgical history that includes Colonoscopy (2013,2016,2020); Nasal septum surgery; Esophagogastroduodenoscopy (2016, 2019); and Tonsillectomy.   Family History: family history includes Colon cancer in an other family member; Diabetes in her maternal uncle; Hypertension in her father and mother; Stroke  "in her maternal grandmother.   Social History: reports that she has never smoked. She has never used smokeless tobacco. She reports that she does not drink alcohol and does not use drugs.    Allergies: Hydrocodone-acetaminophen    Health Maintenance Due   Topic Date Due    PAP SMEAR  02/28/2023       Objective     Vitals:    06/20/24 0931   BP: 127/80   Pulse: 66   Resp: 16   SpO2: 98%   Weight: 75.1 kg (165 lb 9.6 oz)   Height: 162.6 cm (64\")     Body mass index is 28.43 kg/m².     Wt Readings from Last 3 Encounters:   06/20/24 75.1 kg (165 lb 9.6 oz)   03/06/24 72.6 kg (160 lb)   12/22/23 76.2 kg (168 lb)     BP Readings from Last 3 Encounters:   06/20/24 127/80   03/06/24 130/78   12/22/23 131/84     Patient Care Team:  Yi Maharaj PA as PCP - General (Family Medicine)    Physical Exam  Vitals and nursing note reviewed.   Constitutional:       Appearance: Normal appearance.   HENT:      Head: Normocephalic and atraumatic.   Neck:      Vascular: No carotid bruit.      Comments: Thyroid : gland size normal, nontender, no nodules or masses present on palpation   Cardiovascular:      Rate and Rhythm: Normal rate and regular rhythm.      Pulses: Normal pulses.      Heart sounds: Normal heart sounds.   Pulmonary:      Effort: Pulmonary effort is normal.      Breath sounds: Normal breath sounds.   Musculoskeletal:      Cervical back: Neck supple. No tenderness.      Right lower leg: No edema.      Left lower leg: No edema.   Lymphadenopathy:      Cervical: No cervical adenopathy.   Neurological:      Mental Status: She is alert.   Psychiatric:         Mood and Affect: Mood normal.         Behavior: Behavior normal.           Result Review :              Assessment and Plan      Diagnoses and all orders for this visit:    1. Mixed stress and urge urinary incontinence (Primary)  Comments:  chronic problem/worsening: trial of Myrbetriq 25mg daily; admin and side effects discussed; savings card given; update " with progress on MyChart.  Orders:  -     Mirabegron ER (Myrbetriq) 25 MG tablet sustained-release 24 hour 24 hr tablet; Take 1 tablet by mouth Daily.  Dispense: 90 tablet; Refill: 0    2. Allergic rhinitis, unspecified seasonality, unspecified trigger  Comments:  Stable: continue Singulair 10mg daily as needed; follow up in 6mths.  Orders:  -     montelukast (Singulair) 10 MG tablet; Take 1 tablet by mouth Every Night.  Dispense: 90 tablet; Refill: 1    3. Hyperlipidemia, unspecified hyperlipidemia type  Comments:  Stable on Crestor 5mg daily; check labs; follow up in 6mths.  Orders:  -     rosuvastatin (CRESTOR) 5 MG tablet; Take 1 tablet by mouth Daily.  Dispense: 90 tablet; Refill: 1    4. Anxiety  Comments:  Stable on Zoloft 50mg daily;  continue current medications; follow up in 6mths.  Orders:  -     sertraline (ZOLOFT) 50 MG tablet; Take 1 tablet by mouth Daily.  Dispense: 90 tablet; Refill: 1            Follow Up     Return in about 6 months (around 12/20/2024).    Patient was given instructions and counseling regarding her condition or for health maintenance advice. Please see specific information pulled into the AVS if appropriate.

## 2024-06-20 ENCOUNTER — OFFICE VISIT (OUTPATIENT)
Dept: FAMILY MEDICINE CLINIC | Facility: CLINIC | Age: 44
End: 2024-06-20
Payer: COMMERCIAL

## 2024-06-20 VITALS
HEIGHT: 64 IN | SYSTOLIC BLOOD PRESSURE: 127 MMHG | DIASTOLIC BLOOD PRESSURE: 80 MMHG | OXYGEN SATURATION: 98 % | RESPIRATION RATE: 16 BRPM | HEART RATE: 66 BPM | WEIGHT: 165.6 LBS | BODY MASS INDEX: 28.27 KG/M2

## 2024-06-20 DIAGNOSIS — E78.5 HYPERLIPIDEMIA, UNSPECIFIED HYPERLIPIDEMIA TYPE: Chronic | ICD-10-CM

## 2024-06-20 DIAGNOSIS — F41.9 ANXIETY: Chronic | ICD-10-CM

## 2024-06-20 DIAGNOSIS — N39.46 MIXED STRESS AND URGE URINARY INCONTINENCE: Primary | Chronic | ICD-10-CM

## 2024-06-20 DIAGNOSIS — J30.9 ALLERGIC RHINITIS, UNSPECIFIED SEASONALITY, UNSPECIFIED TRIGGER: Chronic | ICD-10-CM

## 2024-06-20 PROCEDURE — 99214 OFFICE O/P EST MOD 30 MIN: CPT | Performed by: PHYSICIAN ASSISTANT

## 2024-06-20 RX ORDER — MIRABEGRON 25 MG/1
25 TABLET, FILM COATED, EXTENDED RELEASE ORAL DAILY
Qty: 90 TABLET | Refills: 0 | Status: SHIPPED | OUTPATIENT
Start: 2024-06-20

## 2024-06-20 RX ORDER — MONTELUKAST SODIUM 10 MG/1
10 TABLET ORAL NIGHTLY
Qty: 90 TABLET | Refills: 1 | Status: SHIPPED | OUTPATIENT
Start: 2024-06-20

## 2024-06-20 RX ORDER — ROSUVASTATIN CALCIUM 5 MG/1
5 TABLET, COATED ORAL DAILY
Qty: 90 TABLET | Refills: 1 | Status: SHIPPED | OUTPATIENT
Start: 2024-06-20

## 2024-08-02 ENCOUNTER — OFFICE VISIT (OUTPATIENT)
Dept: FAMILY MEDICINE CLINIC | Facility: CLINIC | Age: 44
End: 2024-08-02
Payer: COMMERCIAL

## 2024-08-02 VITALS
DIASTOLIC BLOOD PRESSURE: 83 MMHG | HEART RATE: 74 BPM | HEIGHT: 64 IN | BODY MASS INDEX: 28.68 KG/M2 | SYSTOLIC BLOOD PRESSURE: 138 MMHG | OXYGEN SATURATION: 98 % | WEIGHT: 168 LBS

## 2024-08-02 DIAGNOSIS — T78.40XA ALLERGIC REACTION, INITIAL ENCOUNTER: Primary | ICD-10-CM

## 2024-08-02 PROCEDURE — 96372 THER/PROPH/DIAG INJ SC/IM: CPT | Performed by: PHYSICIAN ASSISTANT

## 2024-08-02 PROCEDURE — 99213 OFFICE O/P EST LOW 20 MIN: CPT | Performed by: PHYSICIAN ASSISTANT

## 2024-08-02 RX ORDER — TRIAMCINOLONE ACETONIDE 40 MG/ML
40 INJECTION, SUSPENSION INTRA-ARTICULAR; INTRAMUSCULAR ONCE
Status: COMPLETED | OUTPATIENT
Start: 2024-08-02 | End: 2024-08-02

## 2024-08-02 RX ORDER — METHYLPREDNISOLONE 4 MG/1
TABLET ORAL
Qty: 21 EACH | Refills: 0 | Status: SHIPPED | OUTPATIENT
Start: 2024-08-02

## 2024-08-02 RX ADMIN — TRIAMCINOLONE ACETONIDE 40 MG: 40 INJECTION, SUSPENSION INTRA-ARTICULAR; INTRAMUSCULAR at 10:30
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
## 2024-09-03 DIAGNOSIS — Z30.41 ENCOUNTER FOR SURVEILLANCE OF CONTRACEPTIVE PILLS: ICD-10-CM

## 2024-09-03 RX ORDER — NORETHINDRONE ACETATE AND ETHINYL ESTRADIOL, AND FERROUS FUMARATE 1MG-20(24)
KIT ORAL
Qty: 28 TABLET | Refills: 0 | Status: SHIPPED | OUTPATIENT
Start: 2024-09-03 | End: 2024-09-04 | Stop reason: SDUPTHER

## 2024-09-03 NOTE — PROGRESS NOTES
"Well Woman Visit    CC: Scheduled annual well gyn visit  Chief Complaint   Patient presents with    Annual Exam         Contraception:  Birth control pill    Last Completed Pap Smear       This patient has no relevant Health Maintenance data.           Last Completed Mammogram            Ordered - MAMMOGRAM (Every 2 Years) Ordered on 2023  Mammo Screening Digital Tomosynthesis Bilateral With CAD    2022  Mammo Diagnostic Digital Tomosynthesis Left With CAD    2022  Mammo Screening Digital Tomosynthesis Bilateral With CAD                     HPI:   44 y.o.     Menses:   q 0 days, lasts 0 days, changes products q 0 hrs on heaviest days.   Pain:  None    PCP: does manage PMHx and preventative labs  History: PMHx, Meds, Allergies, PSHx, Social Hx, and POBHx all reviewed and updated.    Pt has no complaints today.    PHYSICAL EXAM:  /88   Pulse 63   Ht 162.6 cm (64\")   Wt 75.3 kg (166 lb)   Breastfeeding No   BMI 28.49 kg/m²  Not found.  General- NAD, alert and oriented, appropriate  Psych- Normal mood, good memory  Neck- No masses, no thyroid enlargement  CV- Regular rhythm, no murnurs  Resp- CTA to bases, no wheezes  Abdomen- Soft, non distended, non tender, no masses  Breast left-  Bilaterally symmetrical, no masses, non tender, no nipple discharge  Breast right- Bilaterally symmetrical, no masses, non tender, no nipple discharge  External genitalia- Normal female, no lesions  Urethra/meatus- Normal, no masses, non tender  Bladder- Normal, no masses, non tender  Vagina- Normal, no atrophy, no lesions, no discharge.    Cvx- Normal, no lesions, no discharge, No cervical motion tenderness  Uterus- Normal size, shape & consistency.  Non tender, mobile.  Adnexa- No mass, non tender  Anus/Rectum/Perineum- Not performed  Lymphatic- No palpable neck, axillary, or groin nodes  Ext- No edema, no cyanosis    Skin- No lesions, no rashes, no acanthosis nigricans      ASSESSMENT " and PLAN:    Diagnoses and all orders for this visit:    1. Well woman exam with routine gynecological exam (Primary)    2. Encounter for surveillance of contraceptive pills  Assessment & Plan:  Patient would like to continue current OCP    Orders:  -     norethindrone-ethinyl estradiol-ferrous fumarate (Edita 24 Fe) 1-20 MG-MCG(24) per tablet; Take 1 tablet by mouth Daily.  Dispense: 84 tablet; Refill: 3    3. Encounter for screening mammogram for malignant neoplasm of breast  -     Mammo Screening Digital Tomosynthesis Bilateral With CAD; Future        Preventative:  BREAST HEALTH- Monthly self breast exam importance and how to reviewed. MMG and/or MRI (prn) reviewed per society guidelines and her individual history. Screen: Pt will call to schedule  CERVICAL CANCER Screening- Reviewed current ASCCP guidelines for screening w and wo cotest HPV, age specific.  Screen: Already up to date  COLON CANCER Screening- Reviewed current medical society guidelines and options.  Screen:  Not medically needed  Follow up PCP/Specialist PMHx and/or Labs      She understands the importance of having any ordered tests to be performed in a timely fashion.  The risks of not performing them include, but are not limited to, advanced cancer stages, bone loss from osteoporosis and/or subsequent increase in morbidity and/or mortality.  She is encouraged to review her results online and/or contact or office if she has questions.     Follow Up:  Return in about 1 year (around 9/4/2025) for Annual physical.            Pinky Webb MD  09/04/2024    [facility] [place] [place] [facility]  [facility] [place] [address]  Dept: [phone]  Dept Fax: [phone]  Loc: [phone]  Loc Fax: [phone]

## 2024-09-04 ENCOUNTER — OFFICE VISIT (OUTPATIENT)
Dept: OBSTETRICS AND GYNECOLOGY | Facility: CLINIC | Age: 44
End: 2024-09-04
Payer: COMMERCIAL

## 2024-09-04 VITALS
SYSTOLIC BLOOD PRESSURE: 149 MMHG | HEIGHT: 64 IN | HEART RATE: 63 BPM | BODY MASS INDEX: 28.34 KG/M2 | DIASTOLIC BLOOD PRESSURE: 88 MMHG | WEIGHT: 166 LBS

## 2024-09-04 DIAGNOSIS — Z30.41 ENCOUNTER FOR SURVEILLANCE OF CONTRACEPTIVE PILLS: ICD-10-CM

## 2024-09-04 DIAGNOSIS — Z01.419 WELL WOMAN EXAM WITH ROUTINE GYNECOLOGICAL EXAM: Primary | ICD-10-CM

## 2024-09-04 DIAGNOSIS — Z12.31 ENCOUNTER FOR SCREENING MAMMOGRAM FOR MALIGNANT NEOPLASM OF BREAST: ICD-10-CM

## 2024-09-04 RX ORDER — NORETHINDRONE ACETATE AND ETHINYL ESTRADIOL, AND FERROUS FUMARATE 1MG-20(24)
1 KIT ORAL DAILY
Qty: 84 TABLET | Refills: 3 | Status: SHIPPED | OUTPATIENT
Start: 2024-09-04

## 2024-09-20 DIAGNOSIS — N39.46 MIXED STRESS AND URGE URINARY INCONTINENCE: Chronic | ICD-10-CM

## 2024-09-20 RX ORDER — MIRABEGRON 25 MG/1
25 TABLET, FILM COATED, EXTENDED RELEASE ORAL DAILY
Qty: 30 TABLET | Refills: 0 | Status: SHIPPED | OUTPATIENT
Start: 2024-09-20

## 2024-10-22 DIAGNOSIS — N39.46 MIXED STRESS AND URGE URINARY INCONTINENCE: Chronic | ICD-10-CM

## 2024-10-23 RX ORDER — MIRABEGRON 25 MG/1
25 TABLET, FILM COATED, EXTENDED RELEASE ORAL DAILY
Qty: 90 TABLET | Refills: 1 | Status: SHIPPED | OUTPATIENT
Start: 2024-10-23

## 2024-12-18 ENCOUNTER — HOSPITAL ENCOUNTER (OUTPATIENT)
Dept: MAMMOGRAPHY | Facility: HOSPITAL | Age: 44
Discharge: HOME | End: 2024-12-18
Admitting: OBSTETRICS & GYNECOLOGY
Payer: COMMERCIAL

## 2024-12-18 DIAGNOSIS — Z12.31 ENCOUNTER FOR SCREENING MAMMOGRAM FOR MALIGNANT NEOPLASM OF BREAST: ICD-10-CM

## 2024-12-18 PROCEDURE — 77063 BREAST TOMOSYNTHESIS BI: CPT

## 2024-12-18 PROCEDURE — 77067 SCR MAMMO BI INCL CAD: CPT

## 2024-12-23 ENCOUNTER — OFFICE VISIT (OUTPATIENT)
Dept: FAMILY MEDICINE CLINIC | Facility: CLINIC | Age: 44
End: 2024-12-23
Payer: COMMERCIAL

## 2024-12-23 VITALS
BODY MASS INDEX: 28 KG/M2 | HEART RATE: 64 BPM | SYSTOLIC BLOOD PRESSURE: 124 MMHG | DIASTOLIC BLOOD PRESSURE: 82 MMHG | HEIGHT: 64 IN | WEIGHT: 164 LBS | OXYGEN SATURATION: 99 %

## 2024-12-23 DIAGNOSIS — K58.9 IRRITABLE BOWEL SYNDROME, UNSPECIFIED TYPE: Chronic | ICD-10-CM

## 2024-12-23 DIAGNOSIS — Z00.00 ANNUAL PHYSICAL EXAM: Primary | ICD-10-CM

## 2024-12-23 DIAGNOSIS — F41.9 ANXIETY: Chronic | ICD-10-CM

## 2024-12-23 DIAGNOSIS — J30.9 ALLERGIC RHINITIS, UNSPECIFIED SEASONALITY, UNSPECIFIED TRIGGER: Chronic | ICD-10-CM

## 2024-12-23 DIAGNOSIS — E78.5 HYPERLIPIDEMIA, UNSPECIFIED HYPERLIPIDEMIA TYPE: Chronic | ICD-10-CM

## 2024-12-23 PROCEDURE — 99396 PREV VISIT EST AGE 40-64: CPT | Performed by: PHYSICIAN ASSISTANT

## 2024-12-23 RX ORDER — ROSUVASTATIN CALCIUM 5 MG/1
5 TABLET, COATED ORAL DAILY
Qty: 90 TABLET | Refills: 1 | Status: SHIPPED | OUTPATIENT
Start: 2024-12-23

## 2024-12-23 RX ORDER — DICYCLOMINE HCL 20 MG
20 TABLET ORAL EVERY 6 HOURS PRN
Qty: 60 TABLET | Refills: 1 | Status: SHIPPED | OUTPATIENT
Start: 2024-12-23

## 2024-12-23 RX ORDER — MONTELUKAST SODIUM 10 MG/1
10 TABLET ORAL NIGHTLY
Qty: 90 TABLET | Refills: 1 | Status: SHIPPED | OUTPATIENT
Start: 2024-12-23
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
## 2025-03-10 ENCOUNTER — LAB (OUTPATIENT)
Dept: LAB | Facility: HOSPITAL | Age: 45
End: 2025-03-10
Payer: COMMERCIAL

## 2025-03-10 DIAGNOSIS — Z00.00 ANNUAL PHYSICAL EXAM: ICD-10-CM

## 2025-03-10 LAB
ALBUMIN SERPL-MCNC: 3.8 G/DL (ref 3.5–5.2)
ALBUMIN/GLOB SERPL: 1.2 G/DL
ALP SERPL-CCNC: 59 U/L (ref 39–117)
ALT SERPL W P-5'-P-CCNC: 11 U/L (ref 1–33)
ANION GAP SERPL CALCULATED.3IONS-SCNC: 12.8 MMOL/L (ref 5–15)
AST SERPL-CCNC: 17 U/L (ref 1–32)
BASOPHILS # BLD AUTO: 0.07 10*3/MM3 (ref 0–0.2)
BASOPHILS NFR BLD AUTO: 0.8 % (ref 0–1.5)
BILIRUB SERPL-MCNC: 0.2 MG/DL (ref 0–1.2)
BUN SERPL-MCNC: 9 MG/DL (ref 6–20)
BUN/CREAT SERPL: 8.9 (ref 7–25)
CALCIUM SPEC-SCNC: 9.4 MG/DL (ref 8.6–10.5)
CHLORIDE SERPL-SCNC: 104 MMOL/L (ref 98–107)
CHOLEST SERPL-MCNC: 195 MG/DL (ref 0–200)
CO2 SERPL-SCNC: 24.2 MMOL/L (ref 22–29)
CREAT SERPL-MCNC: 1.01 MG/DL (ref 0.57–1)
DEPRECATED RDW RBC AUTO: 40.6 FL (ref 37–54)
EGFRCR SERPLBLD CKD-EPI 2021: 71.4 ML/MIN/1.73
EOSINOPHIL # BLD AUTO: 0.18 10*3/MM3 (ref 0–0.4)
EOSINOPHIL NFR BLD AUTO: 2 % (ref 0.3–6.2)
ERYTHROCYTE [DISTWIDTH] IN BLOOD BY AUTOMATED COUNT: 12.6 % (ref 12.3–15.4)
GLOBULIN UR ELPH-MCNC: 3.2 GM/DL
GLUCOSE SERPL-MCNC: 92 MG/DL (ref 65–99)
HCT VFR BLD AUTO: 41.5 % (ref 34–46.6)
HDLC SERPL-MCNC: 64 MG/DL (ref 40–60)
HGB BLD-MCNC: 13.7 G/DL (ref 12–15.9)
IMM GRANULOCYTES # BLD AUTO: 0.01 10*3/MM3 (ref 0–0.05)
IMM GRANULOCYTES NFR BLD AUTO: 0.1 % (ref 0–0.5)
LDLC SERPL CALC-MCNC: 113 MG/DL (ref 0–100)
LDLC/HDLC SERPL: 1.74 {RATIO}
LYMPHOCYTES # BLD AUTO: 4.03 10*3/MM3 (ref 0.7–3.1)
LYMPHOCYTES NFR BLD AUTO: 44.2 % (ref 19.6–45.3)
MCH RBC QN AUTO: 29.2 PG (ref 26.6–33)
MCHC RBC AUTO-ENTMCNC: 33 G/DL (ref 31.5–35.7)
MCV RBC AUTO: 88.5 FL (ref 79–97)
MONOCYTES # BLD AUTO: 0.65 10*3/MM3 (ref 0.1–0.9)
MONOCYTES NFR BLD AUTO: 7.1 % (ref 5–12)
NEUTROPHILS NFR BLD AUTO: 4.18 10*3/MM3 (ref 1.7–7)
NEUTROPHILS NFR BLD AUTO: 45.8 % (ref 42.7–76)
NRBC BLD AUTO-RTO: 0 /100 WBC (ref 0–0.2)
PLATELET # BLD AUTO: 282 10*3/MM3 (ref 140–450)
PMV BLD AUTO: 10.9 FL (ref 6–12)
POTASSIUM SERPL-SCNC: 3.8 MMOL/L (ref 3.5–5.2)
PROT SERPL-MCNC: 7 G/DL (ref 6–8.5)
RBC # BLD AUTO: 4.69 10*6/MM3 (ref 3.77–5.28)
SODIUM SERPL-SCNC: 141 MMOL/L (ref 136–145)
TRIGL SERPL-MCNC: 99 MG/DL (ref 0–150)
TSH SERPL DL<=0.05 MIU/L-ACNC: 5.4 UIU/ML (ref 0.27–4.2)
VLDLC SERPL-MCNC: 18 MG/DL (ref 5–40)
WBC NRBC COR # BLD AUTO: 9.12 10*3/MM3 (ref 3.4–10.8)

## 2025-03-10 PROCEDURE — 36415 COLL VENOUS BLD VENIPUNCTURE: CPT

## 2025-03-10 PROCEDURE — 80050 GENERAL HEALTH PANEL: CPT

## 2025-03-10 PROCEDURE — 80061 LIPID PANEL: CPT

## 2025-03-31 ENCOUNTER — LAB (OUTPATIENT)
Dept: LAB | Facility: HOSPITAL | Age: 45
End: 2025-03-31
Payer: COMMERCIAL

## 2025-03-31 DIAGNOSIS — R79.89 ABNORMAL TSH: ICD-10-CM

## 2025-03-31 LAB
T4 FREE SERPL-MCNC: 0.88 NG/DL (ref 0.92–1.68)
TSH SERPL DL<=0.05 MIU/L-ACNC: 3.67 UIU/ML (ref 0.27–4.2)

## 2025-03-31 PROCEDURE — 84443 ASSAY THYROID STIM HORMONE: CPT

## 2025-03-31 PROCEDURE — 36415 COLL VENOUS BLD VENIPUNCTURE: CPT

## 2025-03-31 PROCEDURE — 84439 ASSAY OF FREE THYROXINE: CPT

## 2025-05-20 DIAGNOSIS — J30.9 ALLERGIC RHINITIS, UNSPECIFIED SEASONALITY, UNSPECIFIED TRIGGER: Chronic | ICD-10-CM

## 2025-05-20 RX ORDER — MONTELUKAST SODIUM 10 MG/1
10 TABLET ORAL NIGHTLY
Qty: 90 TABLET | Refills: 2 | OUTPATIENT
Start: 2025-05-20

## 2025-05-27 DIAGNOSIS — N39.46 MIXED STRESS AND URGE URINARY INCONTINENCE: Chronic | ICD-10-CM

## 2025-05-27 DIAGNOSIS — J30.9 ALLERGIC RHINITIS, UNSPECIFIED SEASONALITY, UNSPECIFIED TRIGGER: Chronic | ICD-10-CM

## 2025-05-27 DIAGNOSIS — E78.5 HYPERLIPIDEMIA, UNSPECIFIED HYPERLIPIDEMIA TYPE: Chronic | ICD-10-CM

## 2025-05-27 DIAGNOSIS — F41.9 ANXIETY: Chronic | ICD-10-CM

## 2025-05-27 RX ORDER — MONTELUKAST SODIUM 10 MG/1
10 TABLET ORAL NIGHTLY
Qty: 90 TABLET | Refills: 1 | Status: CANCELLED | OUTPATIENT
Start: 2025-05-27

## 2025-05-27 RX ORDER — MIRABEGRON 25 MG/1
25 TABLET, FILM COATED, EXTENDED RELEASE ORAL DAILY
Qty: 90 TABLET | Refills: 1 | Status: SHIPPED | OUTPATIENT
Start: 2025-05-27

## 2025-05-27 RX ORDER — ROSUVASTATIN CALCIUM 5 MG/1
5 TABLET, COATED ORAL DAILY
Qty: 90 TABLET | Refills: 1 | Status: CANCELLED | OUTPATIENT
Start: 2025-05-27

## 2025-05-27 NOTE — TELEPHONE ENCOUNTER
Mirabegron ER (MYRBETRIQ) 25 MG tablet sustained-release 24 hour 24 hr tablet   90/1  LF 10.23.24    montelukast (Singulair) 10 MG tablet   90/1  LF 12.23.24    rosuvastatin (CRESTOR) 5 MG tablet   90/1  LF 12.23.24      sertraline (ZOLOFT) 50 MG tablet   90/1  LF 12.23.24    LOV 12.23.24  NA 6/18/25

## 2025-06-11 ENCOUNTER — LAB (OUTPATIENT)
Dept: LAB | Facility: HOSPITAL | Age: 45
End: 2025-06-11
Payer: COMMERCIAL

## 2025-06-11 DIAGNOSIS — E03.9 ACQUIRED HYPOTHYROIDISM: ICD-10-CM

## 2025-06-11 LAB
T4 FREE SERPL-MCNC: 0.99 NG/DL (ref 0.92–1.68)
TSH SERPL DL<=0.05 MIU/L-ACNC: 3.23 UIU/ML (ref 0.27–4.2)

## 2025-06-11 PROCEDURE — 84439 ASSAY OF FREE THYROXINE: CPT

## 2025-06-11 PROCEDURE — 84443 ASSAY THYROID STIM HORMONE: CPT

## 2025-06-11 PROCEDURE — 36415 COLL VENOUS BLD VENIPUNCTURE: CPT

## 2025-06-18 ENCOUNTER — OFFICE VISIT (OUTPATIENT)
Dept: FAMILY MEDICINE CLINIC | Facility: CLINIC | Age: 45
End: 2025-06-18
Payer: COMMERCIAL

## 2025-06-18 VITALS
WEIGHT: 171 LBS | DIASTOLIC BLOOD PRESSURE: 88 MMHG | BODY MASS INDEX: 29.35 KG/M2 | HEART RATE: 66 BPM | SYSTOLIC BLOOD PRESSURE: 128 MMHG | OXYGEN SATURATION: 99 %

## 2025-06-18 DIAGNOSIS — E03.9 ACQUIRED HYPOTHYROIDISM: Chronic | ICD-10-CM

## 2025-06-18 DIAGNOSIS — J30.9 ALLERGIC RHINITIS, UNSPECIFIED SEASONALITY, UNSPECIFIED TRIGGER: Chronic | ICD-10-CM

## 2025-06-18 DIAGNOSIS — K58.9 IRRITABLE BOWEL SYNDROME, UNSPECIFIED TYPE: Primary | Chronic | ICD-10-CM

## 2025-06-18 DIAGNOSIS — F41.9 ANXIETY: Chronic | ICD-10-CM

## 2025-06-18 DIAGNOSIS — E78.5 HYPERLIPIDEMIA, UNSPECIFIED HYPERLIPIDEMIA TYPE: Chronic | ICD-10-CM

## 2025-06-18 PROCEDURE — 99214 OFFICE O/P EST MOD 30 MIN: CPT | Performed by: PHYSICIAN ASSISTANT

## 2025-06-18 RX ORDER — ROSUVASTATIN CALCIUM 5 MG/1
5 TABLET, COATED ORAL DAILY
Qty: 90 TABLET | Refills: 1 | Status: SHIPPED | OUTPATIENT
Start: 2025-06-18

## 2025-06-18 RX ORDER — LEVOTHYROXINE SODIUM 25 UG/1
25 TABLET ORAL
Qty: 90 TABLET | Refills: 1 | Status: SHIPPED | OUTPATIENT
Start: 2025-06-18

## 2025-06-18 RX ORDER — MONTELUKAST SODIUM 10 MG/1
10 TABLET ORAL NIGHTLY
Qty: 90 TABLET | Refills: 1 | Status: SHIPPED | OUTPATIENT
Start: 2025-06-18

## 2025-06-18 NOTE — PROGRESS NOTES
Chief Complaint  Chief Complaint   Patient presents with    Follow-up     6 month    Hyperlipidemia    Hypothyroidism    Anxiety       Subjective          Heidi Mills presents to [facility] for 6mth follow up.    History of Present Illness    IBS: Pt is currently taking Bentyl as needed for the treatment of IBS. Did food panel lab and found triggers; sx much improved.     Allergic Rhinitis: Patient presents for management of Allergic Rhinitis. Patient is currently on Singulair. Symptoms are well controlled.     Anxiety: Patient is currently on Sertraline for anxiety and doing well. Patient states that symptoms are well controlled.     HL: Patient presents for management of hyperlipidemia. Patient is currently on Rosuvastatin. Patient denies muscle cramps and muscle weakness. Patient is monitoring diet to help lower lipids.     Urinary incontinence:  Patient started on Myrbetriq 25mg at last visit--working well without side effects    Hypothyroidism: recently started on levothyroxine 25mcg; labs recently checked and normal.     Mammo: 12/18/24 (ordered and maintained by Dr. Webb)  Labs:   TSH+Free T4 (06/11/2025 08:00) --normal  Last full panel of labs: 3/10/25  Medical History: has a past medical history of Anemia, Anxiety (02/28/2020), Chronic allergic rhinitis, GERD (gastroesophageal reflux disease) (02/28/2020), Hyperlipidemia (02/28/2020), IBS (irritable bowel syndrome) (02/28/2020), Microscopic hematuria (03/18/2016), Pain, knee, Screening for colon cancer (03/11/2016), Tuberculosis, and Varicella.   Surgical History: has a past surgical history that includes Colonoscopy (2013,2016,2020); Nasal septum surgery; Esophagogastroduodenoscopy (2016, 2019); Tonsillectomy; and [place] tooth extraction.   Family History: family history includes Colon cancer in her maternal uncle and another family member; Diabetes in her maternal uncle; Hypertension in her father and mother; Stroke  in her maternal aunt and maternal grandmother.   Social History: reports that she has never smoked. She has never used smokeless tobacco. She reports that she does not drink alcohol and does not use drugs.    Allergies: Hydrocodone-acetaminophen    Health Maintenance Due   Topic Date Due    PAP SMEAR  02/28/2023       Objective     Vitals:    06/18/25 1211   BP: 128/88   Pulse: 66   SpO2: 99%   Weight: 77.6 kg (171 lb)     Body mass index is 29.35 kg/m².     Wt Readings from Last 3 Encounters:   06/18/25 77.6 kg (171 lb)   12/23/24 74.4 kg (164 lb)   09/04/24 75.3 kg (166 lb)     BP Readings from Last 3 Encounters:   06/18/25 128/88   12/23/24 124/82   09/04/24 149/88       Patient Care Team:  Yi Maharaj PA as PCP - General (Family Medicine)    Physical Exam  Vitals and nursing note reviewed.   Constitutional:       Appearance: Normal appearance.   HENT:      Head: Normocephalic and atraumatic.      Right Ear: Tympanic membrane and ear canal normal.      Left Ear: Tympanic membrane and ear canal normal.   Neck:      Vascular: No carotid bruit.      Comments: Thyroid : gland size normal, nontender, no nodules or masses present on palpation   Cardiovascular:      Rate and Rhythm: Normal rate and regular rhythm.      Pulses: Normal pulses.      Heart sounds: Normal heart sounds.   Pulmonary:      Effort: Pulmonary effort is normal.      Breath sounds: Normal breath sounds.   Musculoskeletal:      Cervical back: Neck supple. No tenderness.   Lymphadenopathy:      Cervical: No cervical adenopathy.   Neurological:      Mental Status: She is alert.   Psychiatric:         Mood and Affect: Mood normal.         Behavior: Behavior normal.           Result Review :              Assessment and Plan      Diagnoses and all orders for this visit:    1. Irritable bowel syndrome, unspecified type (Primary)  Comments:  Stable: continue with Bentyl as needed.    2. Acquired hypothyroidism  Comments:  New problem: on  levothyroxine 25mcg daily; last labs normal; continue to monitor.  Orders:  -     levothyroxine (SYNTHROID, LEVOTHROID) 25 MCG tablet; Take 1 tablet by mouth Every Morning.  Dispense: 90 tablet; Refill: 1    3. Allergic rhinitis, unspecified seasonality, unspecified trigger  Comments:  Stable: continue Singulair 10mg daily as needed; follow up in 6mths.  Orders:  -     montelukast (Singulair) 10 MG tablet; Take 1 tablet by mouth Every Night.  Dispense: 90 tablet; Refill: 1    4. Hyperlipidemia, unspecified hyperlipidemia type  Comments:  Stable on Crestor 5mg daily; check labs; follow up in 6mths.  Orders:  -     rosuvastatin (CRESTOR) 5 MG tablet; Take 1 tablet by mouth Daily.  Dispense: 90 tablet; Refill: 1    5. Anxiety  Comments:  Stable on Zoloft 50mg daily;  continue current medications; follow up in 6mths.  Orders:  -     sertraline (ZOLOFT) 50 MG tablet; Take 1 tablet by mouth Daily.  Dispense: 90 tablet; Refill: 1            Follow Up     Return in about 6 months (around 12/18/2025).    Patient was given instructions and counseling regarding her condition or for health maintenance advice. Please see specific information pulled into the AVS if appropriate.

## 2025-07-31 ENCOUNTER — TELEPHONE (OUTPATIENT)
Dept: FAMILY MEDICINE CLINIC | Facility: CLINIC | Age: 45
End: 2025-07-31
Payer: COMMERCIAL